# Patient Record
Sex: FEMALE | Race: WHITE | NOT HISPANIC OR LATINO | Employment: OTHER | URBAN - METROPOLITAN AREA
[De-identification: names, ages, dates, MRNs, and addresses within clinical notes are randomized per-mention and may not be internally consistent; named-entity substitution may affect disease eponyms.]

---

## 2019-08-06 ENCOUNTER — OFFICE VISIT (OUTPATIENT)
Dept: INTERNAL MEDICINE | Facility: CLINIC | Age: 84
End: 2019-08-06

## 2019-08-06 VITALS
WEIGHT: 163.8 LBS | SYSTOLIC BLOOD PRESSURE: 114 MMHG | DIASTOLIC BLOOD PRESSURE: 76 MMHG | HEIGHT: 64 IN | BODY MASS INDEX: 27.96 KG/M2

## 2019-08-06 DIAGNOSIS — I35.1 NONRHEUMATIC AORTIC VALVE INSUFFICIENCY: ICD-10-CM

## 2019-08-06 DIAGNOSIS — E78.5 HYPERLIPIDEMIA, UNSPECIFIED HYPERLIPIDEMIA TYPE: ICD-10-CM

## 2019-08-06 DIAGNOSIS — K21.9 GASTROESOPHAGEAL REFLUX DISEASE, ESOPHAGITIS PRESENCE NOT SPECIFIED: ICD-10-CM

## 2019-08-06 DIAGNOSIS — F32.A DEPRESSION, UNSPECIFIED DEPRESSION TYPE: ICD-10-CM

## 2019-08-06 DIAGNOSIS — I10 ESSENTIAL HYPERTENSION: Primary | ICD-10-CM

## 2019-08-06 PROCEDURE — 99214 OFFICE O/P EST MOD 30 MIN: CPT | Performed by: INTERNAL MEDICINE

## 2019-08-06 RX ORDER — CITALOPRAM 20 MG/1
20 TABLET ORAL DAILY
Qty: 90 TABLET | Refills: 1 | Status: SHIPPED | OUTPATIENT
Start: 2019-08-06 | End: 2020-02-07 | Stop reason: SDUPTHER

## 2019-08-06 RX ORDER — SOLIFENACIN SUCCINATE 5 MG/1
5 TABLET, FILM COATED ORAL DAILY
COMMUNITY
Start: 2016-05-26 | End: 2019-08-06

## 2019-08-06 RX ORDER — SIMVASTATIN 20 MG
20 TABLET ORAL NIGHTLY
Qty: 90 TABLET | Refills: 1 | Status: SHIPPED | OUTPATIENT
Start: 2019-08-06 | End: 2020-02-07 | Stop reason: SDUPTHER

## 2019-08-06 RX ORDER — AMLODIPINE BESYLATE 10 MG/1
TABLET ORAL
COMMUNITY
Start: 2019-07-05 | End: 2019-08-06 | Stop reason: SDUPTHER

## 2019-08-06 RX ORDER — OMEPRAZOLE 40 MG/1
40 CAPSULE, DELAYED RELEASE ORAL DAILY
Qty: 90 CAPSULE | Refills: 1 | Status: SHIPPED | OUTPATIENT
Start: 2019-08-06 | End: 2020-02-07 | Stop reason: SDUPTHER

## 2019-08-06 RX ORDER — LOSARTAN POTASSIUM 100 MG/1
100 TABLET ORAL DAILY
COMMUNITY
End: 2019-08-06 | Stop reason: SDUPTHER

## 2019-08-06 RX ORDER — AMLODIPINE BESYLATE 10 MG/1
10 TABLET ORAL DAILY
Qty: 90 TABLET | Refills: 1 | Status: SHIPPED | OUTPATIENT
Start: 2019-08-06 | End: 2020-02-07 | Stop reason: SDUPTHER

## 2019-08-06 RX ORDER — OMEPRAZOLE 40 MG/1
CAPSULE, DELAYED RELEASE ORAL
COMMUNITY
Start: 2019-07-26 | End: 2019-08-06 | Stop reason: SDUPTHER

## 2019-08-06 RX ORDER — CHOLECALCIFEROL (VITAMIN D3) 125 MCG
CAPSULE ORAL
COMMUNITY
End: 2019-08-06

## 2019-08-06 RX ORDER — SIMVASTATIN 20 MG
20 TABLET ORAL DAILY
COMMUNITY
End: 2019-08-06

## 2019-08-06 RX ORDER — ACETAMINOPHEN,DIPHENHYDRAMINE HCL 500; 25 MG/1; MG/1
1 TABLET, FILM COATED ORAL
COMMUNITY
End: 2020-02-07

## 2019-08-06 RX ORDER — LOSARTAN POTASSIUM 100 MG/1
100 TABLET ORAL DAILY
Qty: 90 TABLET | Refills: 1 | Status: SHIPPED | OUTPATIENT
Start: 2019-08-06 | End: 2020-02-07 | Stop reason: SDUPTHER

## 2019-08-06 RX ORDER — CITALOPRAM 20 MG/1
20 TABLET ORAL DAILY
COMMUNITY
End: 2019-08-06

## 2019-08-06 RX ORDER — OMEPRAZOLE 10 MG/1
CAPSULE, DELAYED RELEASE ORAL DAILY
COMMUNITY
End: 2019-08-06

## 2019-08-06 NOTE — PROGRESS NOTES
Subjective        Chief Complaint   Patient presents with   • Follow-up     6 month fup    • Med Refill   • Hyperlipidemia   • Hypertension     The patient does not have a history of falls. I did complete a risk assessment for falls. A plan of care for falls was not documented.;kendall Chaudhary is a 85 y.o. female who presents for    Patient Active Problem List   Diagnosis   • Hypertension   • Hyperlipidemia   • Depression   • Aortic insufficiency       History of Present Illness     She has dyspnea if it is really hot and that is not new. She denies cough, wheeze, or falls. She has not been checking her BP. She saw Dr. Montgomery last week and her BP was 110/80. He is going to repeat an echo this Friday. She has not had reflux. She is doing well emotionally. She walks 30 minutes daily.  Allergies   Allergen Reactions   • Lisinopril Cough   • Nsaids Nausea And Vomiting   • Fosamax [Alendronate] Other (See Comments)     Stomach ache       Current Outpatient Medications on File Prior to Visit   Medication Sig Dispense Refill   • calcium citrate-vitamin D (CITRACAL+D) 315-200 MG-UNIT per tablet Take  by mouth.     • diphenhydrAMINE-acetaminophen (TYLENOL PM)  MG tablet per tablet Take 1 tablet by mouth.     • melatonin 5 MG tablet tablet Take  by mouth.     • Multiple Vitamins-Minerals (CENTRUM SILVER PO) Take 1 tablet by mouth Daily.     • psyllium (METAMUCIL) 58.6 % packet Take 1 packet by mouth.     • solifenacin (VESICARE) 5 MG tablet Take 5 mg by mouth.     • [DISCONTINUED] amLODIPine (NORVASC) 10 MG tablet      • [DISCONTINUED] aspirin 81 MG tablet Take 81 mg by mouth.     • [DISCONTINUED] citalopram (CeleXA) 20 MG tablet Take 20 mg by mouth.     • [DISCONTINUED] losartan (COZAAR) 100 MG tablet Take 100 mg by mouth Daily.     • [DISCONTINUED] omeprazole (prilOSEC) 10 MG capsule Take  by mouth.     • [DISCONTINUED] omeprazole (priLOSEC) 40 MG capsule      • [DISCONTINUED] simvastatin (ZOCOR) 20 MG tablet  Take 20 mg by mouth.     • [DISCONTINUED] amLODIPine (NORVASC) 2.5 MG tablet Take 2.5 mg by mouth.     • [DISCONTINUED] citalopram (CeleXA) 20 MG tablet Take 20 mg by mouth Daily.     • [DISCONTINUED] losartan (COZAAR) 100 MG tablet Take 100 mg by mouth.     • [DISCONTINUED] melatonin 5 MG tablet tablet Take  by mouth.     • [DISCONTINUED] omeprazole (prilOSEC) 10 MG capsule Take  by mouth Daily.     • [DISCONTINUED] psyllium (METAMUCIL) 58.6 % packet Take 1 packet by mouth Daily.     • [DISCONTINUED] simvastatin (ZOCOR) 20 MG tablet Take 20 mg by mouth Daily.     • [DISCONTINUED] solifenacin (VESICARE) 5 MG tablet Take 5 mg by mouth Daily.       No current facility-administered medications on file prior to visit.        Past Medical History:   Diagnosis Date   • Aortic insufficiency 2015    moderate   • BCC (basal cell carcinoma of skin)    • Depression    • Diverticulosis 2012   • History of positive PPD, untreated    • Hyperactivity of bladder    • Hyperlipidemia    • Hypertension    • LVH (left ventricular hypertrophy)    • Osteopenia    • Pulmonary HTN (CMS/HCC)    • Right hip pain    • Scoliosis    • SOB (shortness of breath)    • Tricuspid regurgitation        Past Surgical History:   Procedure Laterality Date   • BREAST SURGERY     • CATARACT EXTRACTION     • COLONOSCOPY  2013   • DILATATION AND CURETTAGE         Family History   Problem Relation Age of Onset   • Cancer Mother         vaginal   • Stroke Father    • Cancer Sister         liver   • Diabetes Brother    • Stroke Brother    • Hypertension Brother        Social History     Socioeconomic History   • Marital status:      Spouse name: Not on file   • Number of children: Not on file   • Years of education: Not on file   • Highest education level: Not on file   Tobacco Use   • Smoking status: Never Smoker   • Smokeless tobacco: Never Used   Substance and Sexual Activity   • Alcohol use: Yes     Frequency: Monthly or less     Comment: rare   •  "Drug use: No   • Sexual activity: Defer           The following portions of the patient's history were reviewed and updated as appropriate: problem list, allergies, current medications, past medical history, past family history, past social history and past surgical history.    Review of Systems   Respiratory: Positive for shortness of breath.    Gastrointestinal: Negative for GERD.   Psychiatric/Behavioral: The patient is not nervous/anxious.        Immunization History   Administered Date(s) Administered   • Flu Vaccine High Dose PF 65YR+ 09/29/2017, 09/30/2018   • Hepatitis A 05/01/2018, 01/06/2019   • Pneumococcal Conjugate 13-Valent (PCV13) 03/31/2015   • Pneumococcal Polysaccharide (PPSV23) 01/01/2001   • Tdap 04/01/2016   • Zostavax 01/01/2008       Objective   Vitals:    08/06/19 1022   BP: 114/76   Weight: 74.3 kg (163 lb 12.8 oz)   Height: 162.6 cm (64\")     Physical Exam   Constitutional: She appears well-developed and well-nourished.   HENT:   Head: Normocephalic and atraumatic.   Neck: Carotid bruit is not present.   Cardiovascular: Normal rate, regular rhythm, S1 normal and S2 normal.   Murmur heard.   Systolic murmur is present with a grade of 1/6.  Pulmonary/Chest: Effort normal and breath sounds normal.   Neurological: She is alert.   Skin: Skin is warm.   Psychiatric: She has a normal mood and affect.   Vitals reviewed.      Procedures    Assessment/Plan   Shiela was seen today for follow-up, med refill, hyperlipidemia and hypertension.    Diagnoses and all orders for this visit:    Essential hypertension  -     Comprehensive Metabolic Panel; Future  -     amLODIPine (NORVASC) 10 MG tablet; Take 1 tablet by mouth Daily.  -     losartan (COZAAR) 100 MG tablet; Take 1 tablet by mouth Daily.    Hyperlipidemia, unspecified hyperlipidemia type  -     Lipid Panel With / Chol / HDL Ratio; Future  -     simvastatin (ZOCOR) 20 MG tablet; Take 1 tablet by mouth Every Night.    Nonrheumatic aortic valve " insufficiency    Depression, unspecified depression type  -     citalopram (CeleXA) 20 MG tablet; Take 1 tablet by mouth Daily.    Gastroesophageal reflux disease, esophagitis presence not specified  -     omeprazole (priLOSEC) 40 MG capsule; Take 1 capsule by mouth Daily.             BP is great. Doing well emotionally. To get an echo this Friday. Reflux is stable. She is walking regularly. Fall risk is low so does not need further assessment. She sees her gyn yearly.   Okay to stop aspirin. Discussed shingrix. Sign for old records.  Return in about 6 months (around 2/6/2020).

## 2019-11-26 ENCOUNTER — OFFICE VISIT (OUTPATIENT)
Dept: INTERNAL MEDICINE | Facility: CLINIC | Age: 84
End: 2019-11-26

## 2019-11-26 ENCOUNTER — TELEPHONE (OUTPATIENT)
Dept: INTERNAL MEDICINE | Facility: CLINIC | Age: 84
End: 2019-11-26

## 2019-11-26 VITALS
HEIGHT: 65 IN | SYSTOLIC BLOOD PRESSURE: 132 MMHG | DIASTOLIC BLOOD PRESSURE: 80 MMHG | BODY MASS INDEX: 27.92 KG/M2 | WEIGHT: 167.6 LBS

## 2019-11-26 DIAGNOSIS — R07.81 RIB PAIN ON LEFT SIDE: Primary | ICD-10-CM

## 2019-11-26 PROCEDURE — 99213 OFFICE O/P EST LOW 20 MIN: CPT | Performed by: INTERNAL MEDICINE

## 2019-11-26 NOTE — PROGRESS NOTES
Subjective        Chief Complaint   Patient presents with   • Pain     left rib           Shiela Chaudhary is a 85 y.o. female who presents for    Patient Active Problem List   Diagnosis   • Hypertension   • Hyperlipidemia   • Depression   • Aortic insufficiency   • Rib pain on left side       History of Present Illness     She has hurt on her left ribs for the last 10 days. She has not taken anything for it. She is not short of breath. She is not coughing.  Allergies   Allergen Reactions   • Lisinopril Cough   • Nsaids Nausea And Vomiting   • Fosamax [Alendronate] Other (See Comments)     Stomach ache       Current Outpatient Medications on File Prior to Visit   Medication Sig Dispense Refill   • amLODIPine (NORVASC) 10 MG tablet Take 1 tablet by mouth Daily. 90 tablet 1   • calcium citrate-vitamin D (CITRACAL+D) 315-200 MG-UNIT per tablet Take  by mouth.     • citalopram (CeleXA) 20 MG tablet Take 1 tablet by mouth Daily. 90 tablet 1   • diphenhydrAMINE-acetaminophen (TYLENOL PM)  MG tablet per tablet Take 1 tablet by mouth.     • losartan (COZAAR) 100 MG tablet Take 1 tablet by mouth Daily. 90 tablet 1   • melatonin 5 MG tablet tablet Take  by mouth.     • Multiple Vitamins-Minerals (CENTRUM SILVER PO) Take 1 tablet by mouth Daily.     • omeprazole (priLOSEC) 40 MG capsule Take 1 capsule by mouth Daily. 90 capsule 1   • psyllium (METAMUCIL) 58.6 % packet Take 1 packet by mouth.     • simvastatin (ZOCOR) 20 MG tablet Take 1 tablet by mouth Every Night. 90 tablet 1   • solifenacin (VESICARE) 5 MG tablet Take 5 mg by mouth.       No current facility-administered medications on file prior to visit.        Past Medical History:   Diagnosis Date   • Aortic insufficiency 2015    moderate   • BCC (basal cell carcinoma of skin)    • Depression    • Diverticulosis 2012   • History of positive PPD, untreated    • Hyperactivity of bladder    • Hyperlipidemia    • Hypertension    • LVH (left ventricular hypertrophy)    •  "Osteopenia    • Pulmonary HTN (CMS/HCC)    • Right hip pain    • Scoliosis    • SOB (shortness of breath)    • Tricuspid regurgitation        Past Surgical History:   Procedure Laterality Date   • BREAST SURGERY     • CATARACT EXTRACTION     • COLONOSCOPY  2013   • DILATATION AND CURETTAGE         Family History   Problem Relation Age of Onset   • Cancer Mother         vaginal   • Stroke Father    • Cancer Sister         liver   • Diabetes Brother    • Stroke Brother    • Hypertension Brother        Social History     Socioeconomic History   • Marital status:      Spouse name: Not on file   • Number of children: Not on file   • Years of education: Not on file   • Highest education level: Not on file   Tobacco Use   • Smoking status: Never Smoker   • Smokeless tobacco: Never Used   Substance and Sexual Activity   • Alcohol use: Yes     Frequency: Monthly or less     Comment: rare   • Drug use: No   • Sexual activity: Defer           The following portions of the patient's history were reviewed and updated as appropriate: problem list, allergies, current medications, past medical history, past family history, past social history and past surgical history.    Review of Systems   Respiratory: Negative for cough and shortness of breath.        Immunization History   Administered Date(s) Administered   • FLUAD TRI 65YR+ 09/24/2019   • Fluzone High Dose =>65 Years ([employer] ONLY) 09/29/2017, 09/30/2018   • Hepatitis A 05/01/2018, 01/06/2019   • Pneumococcal Conjugate 13-Valent (PCV13) 03/31/2015   • Pneumococcal Polysaccharide (PPSV23) 01/01/2001   • Tdap 04/01/2016   • Zostavax 01/01/2008       Objective   Vitals:    11/26/19 1611   BP: 132/80   Weight: 76 kg (167 lb 9.6 oz)   Height: 165.1 cm (65\")     Body mass index is 27.89 kg/m².  Physical Exam   Constitutional: She appears well-developed and well-nourished.   HENT:   Head: Normocephalic and atraumatic.   Cardiovascular: Normal rate, regular rhythm, S1 normal, " S2 normal and normal heart sounds.   Pulmonary/Chest: Effort normal and breath sounds normal.   Neurological: She is alert.   Skin: Skin is warm.   No rash on her left thorax    Left ribs are non tender   Psychiatric: She has a normal mood and affect.   Vitals reviewed.      Procedures    Assessment/Plan   Shiela was seen today for pain.    Diagnoses and all orders for this visit:    Rib pain on left side             Use tylenol BID since she has not tried anything. Also use heating pad. If not better by Monday, she will call and we can order xray left ribs. 13 minutes spent with over 50 percent on counseling and coordinating care.    No Follow-up on file.

## 2020-01-29 DIAGNOSIS — I10 ESSENTIAL HYPERTENSION: ICD-10-CM

## 2020-01-29 DIAGNOSIS — E78.5 HYPERLIPIDEMIA, UNSPECIFIED HYPERLIPIDEMIA TYPE: ICD-10-CM

## 2020-02-06 LAB
ALBUMIN SERPL-MCNC: 4.4 G/DL (ref 3.5–5.2)
ALBUMIN/GLOB SERPL: 1.7 G/DL
ALP SERPL-CCNC: 82 U/L (ref 39–117)
ALT SERPL-CCNC: 14 U/L (ref 1–33)
AST SERPL-CCNC: 19 U/L (ref 1–32)
BILIRUB SERPL-MCNC: 0.4 MG/DL (ref 0.2–1.2)
BUN SERPL-MCNC: 13 MG/DL (ref 8–23)
BUN/CREAT SERPL: 17.8 (ref 7–25)
CALCIUM SERPL-MCNC: 9.3 MG/DL (ref 8.6–10.5)
CHLORIDE SERPL-SCNC: 98 MMOL/L (ref 98–107)
CHOLEST SERPL-MCNC: 163 MG/DL (ref 0–200)
CHOLEST/HDLC SERPL: 3.26 {RATIO}
CO2 SERPL-SCNC: 27.9 MMOL/L (ref 22–29)
CREAT SERPL-MCNC: 0.73 MG/DL (ref 0.57–1)
GLOBULIN SER CALC-MCNC: 2.6 GM/DL
GLUCOSE SERPL-MCNC: 89 MG/DL (ref 65–99)
HDLC SERPL-MCNC: 50 MG/DL (ref 40–60)
LDLC SERPL CALC-MCNC: 90 MG/DL (ref 0–100)
POTASSIUM SERPL-SCNC: 4.2 MMOL/L (ref 3.5–5.2)
PROT SERPL-MCNC: 7 G/DL (ref 6–8.5)
SODIUM SERPL-SCNC: 137 MMOL/L (ref 136–145)
TRIGL SERPL-MCNC: 116 MG/DL (ref 0–150)
VLDLC SERPL CALC-MCNC: 23.2 MG/DL

## 2020-02-07 ENCOUNTER — OFFICE VISIT (OUTPATIENT)
Dept: INTERNAL MEDICINE | Facility: CLINIC | Age: 85
End: 2020-02-07

## 2020-02-07 VITALS
WEIGHT: 171.4 LBS | SYSTOLIC BLOOD PRESSURE: 122 MMHG | BODY MASS INDEX: 29.26 KG/M2 | HEIGHT: 64 IN | DIASTOLIC BLOOD PRESSURE: 78 MMHG

## 2020-02-07 DIAGNOSIS — F32.5 MAJOR DEPRESSIVE DISORDER IN REMISSION, UNSPECIFIED WHETHER RECURRENT (HCC): ICD-10-CM

## 2020-02-07 DIAGNOSIS — M85.80 OSTEOPENIA, UNSPECIFIED LOCATION: ICD-10-CM

## 2020-02-07 DIAGNOSIS — E78.5 HYPERLIPIDEMIA, UNSPECIFIED HYPERLIPIDEMIA TYPE: ICD-10-CM

## 2020-02-07 DIAGNOSIS — E78.49 OTHER HYPERLIPIDEMIA: ICD-10-CM

## 2020-02-07 DIAGNOSIS — I35.1 NONRHEUMATIC AORTIC VALVE INSUFFICIENCY: ICD-10-CM

## 2020-02-07 DIAGNOSIS — F32.A DEPRESSION, UNSPECIFIED DEPRESSION TYPE: ICD-10-CM

## 2020-02-07 DIAGNOSIS — Z78.0 POST-MENOPAUSAL: ICD-10-CM

## 2020-02-07 DIAGNOSIS — K21.9 GASTROESOPHAGEAL REFLUX DISEASE, ESOPHAGITIS PRESENCE NOT SPECIFIED: ICD-10-CM

## 2020-02-07 DIAGNOSIS — I10 ESSENTIAL HYPERTENSION: Primary | ICD-10-CM

## 2020-02-07 PROCEDURE — 99214 OFFICE O/P EST MOD 30 MIN: CPT | Performed by: INTERNAL MEDICINE

## 2020-02-07 RX ORDER — OMEPRAZOLE 40 MG/1
40 CAPSULE, DELAYED RELEASE ORAL DAILY
Qty: 90 CAPSULE | Refills: 3 | Status: SHIPPED | OUTPATIENT
Start: 2020-02-07 | End: 2021-04-29

## 2020-02-07 RX ORDER — LOSARTAN POTASSIUM 100 MG/1
100 TABLET ORAL DAILY
Qty: 90 TABLET | Refills: 3 | Status: SHIPPED | OUTPATIENT
Start: 2020-02-07 | End: 2021-02-26

## 2020-02-07 RX ORDER — CITALOPRAM 20 MG/1
20 TABLET ORAL DAILY
Qty: 90 TABLET | Refills: 3 | Status: SHIPPED | OUTPATIENT
Start: 2020-02-07 | End: 2020-04-09

## 2020-02-07 RX ORDER — ACETAMINOPHEN 500 MG
500 TABLET ORAL EVERY 6 HOURS PRN
COMMUNITY

## 2020-02-07 RX ORDER — AMLODIPINE BESYLATE 10 MG/1
10 TABLET ORAL DAILY
Qty: 90 TABLET | Refills: 3 | Status: SHIPPED | OUTPATIENT
Start: 2020-02-07 | End: 2021-04-06

## 2020-02-07 RX ORDER — SIMVASTATIN 20 MG
20 TABLET ORAL NIGHTLY
Qty: 90 TABLET | Refills: 3 | Status: SHIPPED | OUTPATIENT
Start: 2020-02-07 | End: 2021-04-13

## 2020-02-07 RX ORDER — METRONIDAZOLE 7.5 MG/G
LOTION TOPICAL
COMMUNITY
Start: 2020-01-19 | End: 2020-06-26

## 2020-02-07 NOTE — PROGRESS NOTES
Subjective        Chief Complaint   Patient presents with   • Hypertension     6 month fup htn med eval lab review            Shiela Chaudhary is a 85 y.o. female who presents for    Patient Active Problem List   Diagnosis   • Hypertension   • Hyperlipidemia   • Depression   • Aortic insufficiency   • Osteopenia       History of Present Illness     Her only dyspnea is with bending over. She sleeps on one pillow. She denies chest pain. She walks her dog for 15 minutes. She is doing well emotionally; she is not depressed. She has not checked her BP.   Allergies   Allergen Reactions   • Lisinopril Cough   • Nsaids Nausea And Vomiting   • Fosamax [Alendronate] Other (See Comments)     Stomach ache       Current Outpatient Medications on File Prior to Visit   Medication Sig Dispense Refill   • acetaminophen (TYLENOL) 500 MG tablet Take 500 mg by mouth Every 6 (Six) Hours As Needed for Mild Pain .     • calcium citrate-vitamin D (CITRACAL+D) 315-200 MG-UNIT per tablet Take  by mouth.     • melatonin 5 MG tablet tablet Take  by mouth.     • metroNIDAZOLE (FLAGYL) 0.75 % lotion lotion      • Multiple Vitamins-Minerals (CENTRUM SILVER PO) Take 1 tablet by mouth Daily.     • psyllium (METAMUCIL) 58.6 % packet Take 1 packet by mouth.     • solifenacin (VESICARE) 5 MG tablet Take 5 mg by mouth.     • [DISCONTINUED] amLODIPine (NORVASC) 10 MG tablet Take 1 tablet by mouth Daily. 90 tablet 1   • [DISCONTINUED] citalopram (CeleXA) 20 MG tablet Take 1 tablet by mouth Daily. 90 tablet 1   • [DISCONTINUED] losartan (COZAAR) 100 MG tablet Take 1 tablet by mouth Daily. 90 tablet 1   • [DISCONTINUED] omeprazole (priLOSEC) 40 MG capsule Take 1 capsule by mouth Daily. 90 capsule 1   • [DISCONTINUED] simvastatin (ZOCOR) 20 MG tablet Take 1 tablet by mouth Every Night. 90 tablet 1   • [DISCONTINUED] diphenhydrAMINE-acetaminophen (TYLENOL PM)  MG tablet per tablet Take 1 tablet by mouth.       No current facility-administered medications  on file prior to visit.        Past Medical History:   Diagnosis Date   • Aortic insufficiency 2015    moderate   • BCC (basal cell carcinoma of skin)    • Depression    • Diverticulosis 2012   • History of positive PPD, untreated    • Hyperactivity of bladder    • Hyperlipidemia    • Hypertension    • LVH (left ventricular hypertrophy)    • Osteopenia    • Pulmonary HTN (CMS/HCC)    • Right hip pain    • Scoliosis    • SOB (shortness of breath)    • Tricuspid regurgitation        Past Surgical History:   Procedure Laterality Date   • BREAST SURGERY     • CATARACT EXTRACTION     • COLONOSCOPY  2013   • DILATATION AND CURETTAGE         Family History   Problem Relation Age of Onset   • Cancer Mother         vaginal   • Stroke Father    • Cancer Sister         liver   • Diabetes Brother    • Stroke Brother    • Hypertension Brother        Social History     Socioeconomic History   • Marital status:      Spouse name: Not on file   • Number of children: Not on file   • Years of education: Not on file   • Highest education level: Not on file   Tobacco Use   • Smoking status: Never Smoker   • Smokeless tobacco: Never Used   Substance and Sexual Activity   • Alcohol use: Yes     Frequency: Monthly or less     Comment: rare   • Drug use: No   • Sexual activity: Defer           The following portions of the patient's history were reviewed and updated as appropriate: problem list, allergies, current medications, past medical history, past family history, past social history and past surgical history.    Review of Systems   Cardiovascular: Negative for chest pain.   Psychiatric/Behavioral: Negative for depressed mood.       Immunization History   Administered Date(s) Administered   • FLUAD TRI 65YR+ 09/24/2019   • Fluzone High Dose =>65 Years ([employer] ONLY) 09/29/2017, 09/30/2018   • Hepatitis A 05/01/2018, 01/06/2019   • Pneumococcal Conjugate 13-Valent (PCV13) 03/31/2015   • Pneumococcal Polysaccharide (PPSV23)  "01/01/2001   • Tdap 04/01/2016   • Zostavax 01/01/2008       Objective   Vitals:    02/07/20 1021   BP: 122/78   Weight: 77.7 kg (171 lb 6.4 oz)   Height: 162.6 cm (64\")     Body mass index is 29.42 kg/m².  Physical Exam   Constitutional: She appears well-developed and well-nourished.   HENT:   Head: Normocephalic and atraumatic.   Cardiovascular: Normal rate, regular rhythm, S1 normal and S2 normal.   Murmur heard.   Systolic murmur is present with a grade of 1/6.  Pulmonary/Chest: Effort normal and breath sounds normal.   Neurological: She is alert.   Skin: Skin is warm.   Psychiatric: She has a normal mood and affect.   Vitals reviewed.      Procedures    Assessment/Plan   Shiela was seen today for hypertension.    Diagnoses and all orders for this visit:    Essential hypertension  -     losartan (COZAAR) 100 MG tablet; Take 1 tablet by mouth Daily.  -     amLODIPine (NORVASC) 10 MG tablet; Take 1 tablet by mouth Daily.    Other hyperlipidemia  Comments:  LDL is excellent    Major depressive disorder in remission, unspecified whether recurrent (CMS/[facility])  Comments:  She is stable emotionally    Nonrheumatic aortic valve insufficiency  Comments:  She sees Dr. Montgomery once per year    Osteopenia, unspecified location    Hyperlipidemia, unspecified hyperlipidemia type  -     simvastatin (ZOCOR) 20 MG tablet; Take 1 tablet by mouth Every Night.    Depression, unspecified depression type  -     citalopram (CeleXA) 20 MG tablet; Take 1 tablet by mouth Daily.    Gastroesophageal reflux disease, esophagitis presence not specified  -     omeprazole (priLOSEC) 40 MG capsule; Take 1 capsule by mouth Daily.    Post-menopausal  -     DEXA Bone Density Axial; Future               Labs reviewed. BP is good. She is going to get her MMG at [facility]. Asked her to drop off advanced directive. DEXA next time.  Return in about 6 months (around 8/7/2020).  "

## 2020-04-08 DIAGNOSIS — F32.A DEPRESSION, UNSPECIFIED DEPRESSION TYPE: ICD-10-CM

## 2020-04-09 RX ORDER — CITALOPRAM 20 MG/1
TABLET ORAL
Qty: 90 TABLET | Refills: 0 | Status: SHIPPED | OUTPATIENT
Start: 2020-04-09 | End: 2021-10-04

## 2020-06-26 ENCOUNTER — OFFICE VISIT (OUTPATIENT)
Dept: INTERNAL MEDICINE | Facility: CLINIC | Age: 85
End: 2020-06-26

## 2020-06-26 VITALS
TEMPERATURE: 97.6 F | BODY MASS INDEX: 29.09 KG/M2 | WEIGHT: 181 LBS | SYSTOLIC BLOOD PRESSURE: 134 MMHG | DIASTOLIC BLOOD PRESSURE: 78 MMHG | HEIGHT: 66 IN

## 2020-06-26 DIAGNOSIS — I10 ESSENTIAL HYPERTENSION: ICD-10-CM

## 2020-06-26 DIAGNOSIS — M54.6 THORACIC SPINE PAIN: Primary | ICD-10-CM

## 2020-06-26 PROCEDURE — 99213 OFFICE O/P EST LOW 20 MIN: CPT | Performed by: INTERNAL MEDICINE

## 2020-06-26 NOTE — PROGRESS NOTES
Subjective        Chief Complaint   Patient presents with   • Back Pain     x1 week           Shiela Chaudhary is a 86 y.o. female who presents for    Patient Active Problem List   Diagnosis   • Hypertension   • Hyperlipidemia   • Depression   • Aortic insufficiency   • Osteopenia   • Thoracic spine pain       History of Present Illness     She aches across the top of her shoulders for one week. She was lifting up a full sprinkling can and then she noticed pain afterwards. She used a heating pad and it helps. She has no arm weakness. She has good ROM of her shoulders. The pain does not keep her up at night.  Allergies   Allergen Reactions   • Lisinopril Cough   • Nsaids Nausea And Vomiting   • Fosamax [Alendronate] Other (See Comments)     Stomach ache       Current Outpatient Medications on File Prior to Visit   Medication Sig Dispense Refill   • acetaminophen (TYLENOL) 500 MG tablet Take 500 mg by mouth Every 6 (Six) Hours As Needed for Mild Pain .     • amLODIPine (NORVASC) 10 MG tablet Take 1 tablet by mouth Daily. 90 tablet 3   • citalopram (CeleXA) 20 MG tablet TAKE ONE TABLET BY MOUTH DAILY 90 tablet 0   • losartan (COZAAR) 100 MG tablet Take 1 tablet by mouth Daily. 90 tablet 3   • melatonin 5 MG tablet tablet Take  by mouth.     • Multiple Vitamins-Minerals (CENTRUM SILVER PO) Take 1 tablet by mouth Daily.     • omeprazole (priLOSEC) 40 MG capsule Take 1 capsule by mouth Daily. 90 capsule 3   • psyllium (METAMUCIL) 58.6 % packet Take 1 packet by mouth.     • simvastatin (ZOCOR) 20 MG tablet Take 1 tablet by mouth Every Night. 90 tablet 3   • solifenacin (VESICARE) 5 MG tablet Take 5 mg by mouth.     • [DISCONTINUED] calcium citrate-vitamin D (CITRACAL+D) 315-200 MG-UNIT per tablet Take  by mouth.     • [DISCONTINUED] metroNIDAZOLE (FLAGYL) 0.75 % lotion lotion        No current facility-administered medications on file prior to visit.        Past Medical History:   Diagnosis Date   • Aortic insufficiency 2015     moderate   • BCC (basal cell carcinoma of skin)    • Depression    • Diverticulosis 2012   • History of positive PPD, untreated    • Hyperactivity of bladder    • Hyperlipidemia    • Hypertension    • LVH (left ventricular hypertrophy)    • Osteopenia    • Pulmonary HTN (CMS/HCC)    • Right hip pain    • Scoliosis    • SOB (shortness of breath)    • Tricuspid regurgitation        Past Surgical History:   Procedure Laterality Date   • BREAST SURGERY     • CATARACT EXTRACTION     • COLONOSCOPY  2013   • DILATATION AND CURETTAGE         Family History   Problem Relation Age of Onset   • Cancer Mother         vaginal   • Stroke Father    • Cancer Sister         liver   • Diabetes Brother    • Stroke Brother    • Hypertension Brother        Social History     Socioeconomic History   • Marital status:      Spouse name: Not on file   • Number of children: Not on file   • Years of education: Not on file   • Highest education level: Not on file   Tobacco Use   • Smoking status: Never Smoker   • Smokeless tobacco: Never Used   Substance and Sexual Activity   • Alcohol use: Yes     Frequency: Monthly or less     Comment: rare   • Drug use: No   • Sexual activity: Defer           The following portions of the patient's history were reviewed and updated as appropriate: problem list, allergies, current medications, past medical history, past family history, past social history and past surgical history.    Review of Systems   Cardiovascular: Negative for chest pain.   Musculoskeletal: Positive for back pain.       Immunization History   Administered Date(s) Administered   • FLUAD TRI 65YR+ 09/24/2019   • Fluzone High Dose =>65 Years ([employer] ONLY) 09/29/2017, 09/30/2018   • Hepatitis A 05/01/2018, 01/06/2019   • Pneumococcal Conjugate 13-Valent (PCV13) 03/31/2015   • Pneumococcal Polysaccharide (PPSV23) 01/01/2001   • Tdap 04/01/2016   • Zostavax 01/01/2008       Objective   Vitals:    06/26/20 1656   BP: 134/78   Temp:  "97.6 °F (36.4 °C)   Weight: 82.1 kg (181 lb)   Height: 166.4 cm (65.5\")     Body mass index is 29.66 kg/m².  Physical Exam   Constitutional: She appears well-developed and well-nourished.   HENT:   Head: Normocephalic and atraumatic.   Cardiovascular: Normal rate, regular rhythm, S1 normal, S2 normal and normal heart sounds.   Pulmonary/Chest: Effort normal and breath sounds normal.   Musculoskeletal:   Back non tender    Shoulders FROM   Neurological: She is alert.   Skin: Skin is warm.   Psychiatric: She has a normal mood and affect.   Vitals reviewed.      Procedures    Assessment/Plan   Shiela was seen today for back pain.    Diagnoses and all orders for this visit:    Thoracic spine pain    Essential hypertension               I think she pulled something. Recc continue heat and APAP. BP is okay today.  No follow-ups on file.  "

## 2020-06-30 ENCOUNTER — HOSPITAL ENCOUNTER (OUTPATIENT)
Dept: BONE DENSITY | Facility: HOSPITAL | Age: 85
Discharge: HOME | End: 2020-06-30
Admitting: INTERNAL MEDICINE

## 2020-06-30 DIAGNOSIS — Z78.0 POST-MENOPAUSAL: ICD-10-CM

## 2020-06-30 PROCEDURE — 77080 DXA BONE DENSITY AXIAL: CPT

## 2020-07-16 ENCOUNTER — TELEPHONE (OUTPATIENT)
Dept: URGENT CARE | Facility: CLINIC | Age: 85
End: 2020-07-16

## 2020-07-16 NOTE — TELEPHONE ENCOUNTER
PATIENT CALLED BACK.  INFORMED PATIENT OF PROVIDER'S, ADDRESSA JULI, X-RAY FINDINGS.  ADVISED TO  SLING AND TO FOLLOW UP WITH ORTHO.

## 2020-08-07 ENCOUNTER — OFFICE VISIT (OUTPATIENT)
Dept: INTERNAL MEDICINE | Facility: CLINIC | Age: 85
End: 2020-08-07

## 2020-08-07 VITALS
TEMPERATURE: 98.1 F | HEIGHT: 62 IN | DIASTOLIC BLOOD PRESSURE: 82 MMHG | SYSTOLIC BLOOD PRESSURE: 120 MMHG | WEIGHT: 166.6 LBS | BODY MASS INDEX: 30.66 KG/M2

## 2020-08-07 DIAGNOSIS — F32.89 OTHER DEPRESSION: ICD-10-CM

## 2020-08-07 DIAGNOSIS — M85.80 OSTEOPENIA, UNSPECIFIED LOCATION: ICD-10-CM

## 2020-08-07 DIAGNOSIS — I10 ESSENTIAL HYPERTENSION: Primary | ICD-10-CM

## 2020-08-07 DIAGNOSIS — E78.49 OTHER HYPERLIPIDEMIA: ICD-10-CM

## 2020-08-07 PROCEDURE — 99213 OFFICE O/P EST LOW 20 MIN: CPT | Performed by: INTERNAL MEDICINE

## 2020-08-07 NOTE — PROGRESS NOTES
Subjective        Chief Complaint   Patient presents with   • Hypertension           Shiela Chaudhary is a 86 y.o. female who presents for    Patient Active Problem List   Diagnosis   • Hypertension   • Hyperlipidemia   • Depression   • Aortic insufficiency   • Osteopenia   • Thoracic spine pain       History of Present Illness     She has not been checking her BP. She is walking some. She denies chest pain or reflux.  Allergies   Allergen Reactions   • Lisinopril Cough   • Nsaids Nausea And Vomiting   • Fosamax [Alendronate] Other (See Comments)     Stomach ache       Current Outpatient Medications on File Prior to Visit   Medication Sig Dispense Refill   • acetaminophen (TYLENOL) 500 MG tablet Take 500 mg by mouth Every 6 (Six) Hours As Needed for Mild Pain .     • amLODIPine (NORVASC) 10 MG tablet Take 1 tablet by mouth Daily. 90 tablet 3   • citalopram (CeleXA) 20 MG tablet TAKE ONE TABLET BY MOUTH DAILY 90 tablet 0   • losartan (COZAAR) 100 MG tablet Take 1 tablet by mouth Daily. 90 tablet 3   • melatonin 5 MG tablet tablet Take  by mouth.     • Multiple Vitamins-Minerals (CENTRUM SILVER PO) Take 1 tablet by mouth Daily.     • omeprazole (priLOSEC) 40 MG capsule Take 1 capsule by mouth Daily. 90 capsule 3   • psyllium (METAMUCIL) 58.6 % packet Take 1 packet by mouth.     • simvastatin (ZOCOR) 20 MG tablet Take 1 tablet by mouth Every Night. 90 tablet 3   • solifenacin (VESICARE) 5 MG tablet Take 5 mg by mouth.       No current facility-administered medications on file prior to visit.        Past Medical History:   Diagnosis Date   • Aortic insufficiency 2015    moderate   • BCC (basal cell carcinoma of skin)    • Depression    • Diverticulosis 2012   • History of positive PPD, untreated    • Hyperactivity of bladder    • Hyperlipidemia    • Hypertension    • LVH (left ventricular hypertrophy)    • Osteopenia    • Pulmonary HTN (CMS/HCC)    • Right hip pain    • Scoliosis    • SOB (shortness of breath)    •  "Tricuspid regurgitation        Past Surgical History:   Procedure Laterality Date   • BREAST SURGERY     • CATARACT EXTRACTION     • COLONOSCOPY  2013   • DILATATION AND CURETTAGE         Family History   Problem Relation Age of Onset   • Cancer Mother         vaginal   • Stroke Father    • Cancer Sister         liver   • Diabetes Brother    • Stroke Brother    • Hypertension Brother        Social History     Socioeconomic History   • Marital status:      Spouse name: Not on file   • Number of children: Not on file   • Years of education: Not on file   • Highest education level: Not on file   Tobacco Use   • Smoking status: Never Smoker   • Smokeless tobacco: Never Used   Substance and Sexual Activity   • Alcohol use: Yes     Frequency: Monthly or less     Comment: rare   • Drug use: No   • Sexual activity: Defer           The following portions of the patient's history were reviewed and updated as appropriate: problem list, allergies, current medications, past medical history, past family history, past social history and past surgical history.    Review of Systems   Respiratory: Negative for shortness of breath.    Cardiovascular: Negative for chest pain.       Immunization History   Administered Date(s) Administered   • FLUAD TRI 65YR+ 09/24/2019   • Fluzone High Dose =>65 Years (Vaxcare ONLY) 09/29/2017, 09/30/2018   • Hepatitis A 05/01/2018, 01/06/2019   • Pneumococcal Conjugate 13-Valent (PCV13) 03/31/2015   • Pneumococcal Polysaccharide (PPSV23) 01/01/2001   • Tdap 04/01/2016   • Zostavax 01/01/2008       Objective   Vitals:    08/07/20 1120   BP: 120/82   Temp: 98.1 °F (36.7 °C)   Weight: 75.6 kg (166 lb 9.6 oz)   Height: 157.5 cm (62\")     Body mass index is 30.47 kg/m².  Physical Exam   Constitutional: She appears well-developed and well-nourished.   HENT:   Head: Normocephalic and atraumatic.   Neck: Carotid bruit is not present.   Cardiovascular: Normal rate, regular rhythm, S1 normal and S2 " normal.   Murmur heard.   Systolic murmur is present with a grade of 1/6.  Pulmonary/Chest: Effort normal and breath sounds normal.   Neurological: She is alert.   Skin: Skin is warm.   Psychiatric: She has a normal mood and affect.   Vitals reviewed.      Procedures    Assessment/Plan   Shiela was seen today for hypertension.    Diagnoses and all orders for this visit:    Essential hypertension    Other depression    Osteopenia, unspecified location    Other hyperlipidemia  -     Comprehensive Metabolic Panel; Future  -     Lipid Panel With / Chol / HDL Ratio; Future               BP is good. Stable emotionally. Reviewed DEXA. Discussed calcium and vit D.  Return in about 6 months (around 2/7/2021) for Medicare Wellness.

## 2020-09-17 ENCOUNTER — TELEPHONE (OUTPATIENT)
Dept: INTERNAL MEDICINE | Facility: CLINIC | Age: 85
End: 2020-09-17

## 2020-09-17 NOTE — TELEPHONE ENCOUNTER
Totally fine to get the new one. It is not a live vaccine. She actually got the live shot in 2008 but it likely has worn off since those only last about 6 years

## 2020-09-17 NOTE — TELEPHONE ENCOUNTER
PATIENT IS CALLING IN SHE STATES SHE WOULD CLARIFICATION ON [facility]ETHER OR NOT SHE SHOULD GET THE SHINGLES SHOT.    SHE STATES IS NOT CERTAIN THAT SHE EVER HAD CHICKEN POX IN THE PAST AND WAS TOLD IF SHE WAS NOT SURE SHE SHOULDN'T GET THE SHOT.    CAN YOU CALLBACK AND VERIFY THIS WITH HER.    CALLBACK NUMBER IS:  [phone]

## 2021-02-26 DIAGNOSIS — I10 ESSENTIAL HYPERTENSION: ICD-10-CM

## 2021-02-26 RX ORDER — LOSARTAN POTASSIUM 100 MG/1
TABLET ORAL
Qty: 90 TABLET | Refills: 2 | Status: SHIPPED | OUTPATIENT
Start: 2021-02-26 | End: 2022-03-07

## 2021-04-05 DIAGNOSIS — I10 ESSENTIAL HYPERTENSION: ICD-10-CM

## 2021-04-06 RX ORDER — AMLODIPINE BESYLATE 10 MG/1
TABLET ORAL
Qty: 90 TABLET | Refills: 2 | Status: SHIPPED | OUTPATIENT
Start: 2021-04-06 | End: 2021-04-08

## 2021-04-07 DIAGNOSIS — I10 ESSENTIAL HYPERTENSION: ICD-10-CM

## 2021-04-08 RX ORDER — AMLODIPINE BESYLATE 10 MG/1
TABLET ORAL
Qty: 90 TABLET | Refills: 2 | Status: SHIPPED | OUTPATIENT
Start: 2021-04-08 | End: 2021-04-12 | Stop reason: SDUPTHER

## 2021-04-12 ENCOUNTER — OFFICE VISIT (OUTPATIENT)
Dept: INTERNAL MEDICINE | Facility: CLINIC | Age: 86
End: 2021-04-12

## 2021-04-12 VITALS
TEMPERATURE: 97.8 F | WEIGHT: 169 LBS | SYSTOLIC BLOOD PRESSURE: 122 MMHG | BODY MASS INDEX: 31.1 KG/M2 | HEIGHT: 62 IN | DIASTOLIC BLOOD PRESSURE: 78 MMHG

## 2021-04-12 DIAGNOSIS — R60.0 LEG EDEMA, LEFT: ICD-10-CM

## 2021-04-12 DIAGNOSIS — Z00.00 MEDICARE ANNUAL WELLNESS VISIT, SUBSEQUENT: Primary | ICD-10-CM

## 2021-04-12 DIAGNOSIS — I10 ESSENTIAL HYPERTENSION: ICD-10-CM

## 2021-04-12 DIAGNOSIS — E78.49 OTHER HYPERLIPIDEMIA: Chronic | ICD-10-CM

## 2021-04-12 DIAGNOSIS — E78.5 HYPERLIPIDEMIA, UNSPECIFIED HYPERLIPIDEMIA TYPE: ICD-10-CM

## 2021-04-12 PROCEDURE — 99213 OFFICE O/P EST LOW 20 MIN: CPT | Performed by: INTERNAL MEDICINE

## 2021-04-12 PROCEDURE — G0439 PPPS, SUBSEQ VISIT: HCPCS | Performed by: INTERNAL MEDICINE

## 2021-04-12 RX ORDER — FUROSEMIDE 20 MG/1
20 TABLET ORAL DAILY
COMMUNITY
Start: 2021-03-18

## 2021-04-12 RX ORDER — AMLODIPINE BESYLATE 10 MG/1
10 TABLET ORAL DAILY
Qty: 90 TABLET | Refills: 2 | Status: SHIPPED | OUTPATIENT
Start: 2021-04-12 | End: 2022-05-02

## 2021-04-12 NOTE — PROGRESS NOTES
The ABCs of the Annual Wellness Visit  Subsequent Medicare Wellness Visit    Chief Complaint   Patient presents with   • Medicare Wellness-subsequent       Subjective   History of Present Illness:  Shiela Chaudhary is a 86 y.o. female who presents for a Subsequent Medicare Wellness Visit.  She had an echo this year with Dr. Montgomery and she reports that there were no significant changes. She denies chest pain or dyspnea. She has some edema in her left leg at the end of the day; it started 3-4 months ago. Dr. Montgomery started her on Lasix and it has helped her edema.  HEALTH RISK ASSESSMENT    Recent Hospitalizations:  No hospitalization(s) within the last year.    Current Medical Providers:  Patient Care Team:  Micheal Almaguer MD as PCP - General (Internal Medicine)    Smoking Status:  Social History     Tobacco Use   Smoking Status Never Smoker   Smokeless Tobacco Never Used       Alcohol Consumption:  Social History     Substance and Sexual Activity   Alcohol Use Yes    Comment: rare       Depression Screen:   PHQ-2/PHQ-9 Depression Screening 4/12/2021   Little interest or pleasure in doing things 0   Feeling down, depressed, or hopeless 0   Total Score 0       Fall Risk Screen:  STEADI Fall Risk Assessment was completed, and patient is at LOW risk for falls.Assessment completed on:4/12/2021    Health Habits and Functional and Cognitive Screening:  Functional & Cognitive Status 4/12/2021   Do you have difficulty preparing food and eating? No   Do you have difficulty bathing yourself, getting dressed or grooming yourself? No   Do you have difficulty using the toilet? No   Do you have difficulty moving around from place to place? No   Do you have trouble with steps or getting out of a bed or a chair? No   Current Diet Limited Junk Food   Dental Exam Up to date   Eye Exam Up to date   Exercise (times per week) 7 times per week   Current Exercise Activities Include Walking   Do you need help using the phone?  No   Are  you deaf or do you have serious difficulty hearing?  No   Do you need help with transportation? No   Do you need help shopping? No   Do you need help preparing meals?  No   Do you need help with housework?  No   Do you need help with laundry? No   Do you need help taking your medications? No   Do you need help managing money? No   Do you ever drive or ride in a car without wearing a seat belt? No   Have you felt unusual stress, anger or loneliness in the last month? No   Who do you live with? Child   If you need help, do you have trouble finding someone available to you? No   Have you been bothered in the last four weeks by sexual problems? No   Do you have difficulty concentrating, remembering or making decisions? No         Does the patient have evidence of cognitive impairment? No    Asprin use counseling:Does not need ASA (and currently is not on it)    Age-appropriate Screening Schedule:  Refer to the list below for future screening recommendations based on patient's age, sex and/or medical conditions. Orders for these recommended tests are listed in the plan section. The patient has been provided with a written plan.    Health Maintenance   Topic Date Due   • ZOSTER VACCINE (2 of 3) 02/26/2008   • LIPID PANEL  02/06/2021   • INFLUENZA VACCINE  08/01/2021   • DXA SCAN  06/30/2022   • TDAP/TD VACCINES (2 - Td) 04/01/2026          The following portions of the patient's history were reviewed and updated as appropriate: allergies, current medications, past family history, past medical history, past social history, past surgical history and problem list.    Outpatient Medications Prior to Visit   Medication Sig Dispense Refill   • acetaminophen (TYLENOL) 500 MG tablet Take 500 mg by mouth Every 6 (Six) Hours As Needed for Mild Pain .     • citalopram (CeleXA) 20 MG tablet TAKE ONE TABLET BY MOUTH DAILY 90 tablet 0   • furosemide (LASIX) 20 MG tablet      • losartan (COZAAR) 100 MG tablet TAKE ONE TABLET BY MOUTH  "DAILY 90 tablet 2   • melatonin 5 MG tablet tablet Take  by mouth.     • Multiple Vitamins-Minerals (CENTRUM SILVER PO) Take 1 tablet by mouth Daily.     • omeprazole (priLOSEC) 40 MG capsule Take 1 capsule by mouth Daily. 90 capsule 3   • psyllium (METAMUCIL) 58.6 % packet Take 1 packet by mouth.     • simvastatin (ZOCOR) 20 MG tablet Take 1 tablet by mouth Every Night. 90 tablet 3   • solifenacin (VESICARE) 5 MG tablet Take 5 mg by mouth.     • amLODIPine (NORVASC) 10 MG tablet TAKE ONE TABLET BY MOUTH DAILY 90 tablet 2     No facility-administered medications prior to visit.       Patient Active Problem List   Diagnosis   • Essential hypertension   • Other hyperlipidemia   • Depression   • Aortic insufficiency   • Osteopenia   • Thoracic spine pain       Advanced Care Planning:  ACP discussion was held with the patient during this visit. Patient has an advance directive (not in EMR), copy requested.    Review of Systems    Compared to one year ago, the patient feels her physical health is the same.  Compared to one year ago, the patient feels her mental health is the same.    Reviewed chart for potential of high risk medication in the elderly: yes  Reviewed chart for potential of harmful drug interactions in the elderly:yes    Objective         Vitals:    04/12/21 1540   BP: 122/78   Temp: 97.8 °F (36.6 °C)   Weight: 76.7 kg (169 lb)   Height: 157.5 cm (62\")       Body mass index is 30.91 kg/m².  Discussed the patient's BMI with her. The BMI is above average; BMI management plan is completed.    Physical Exam  Vitals reviewed.   Constitutional:       Appearance: She is well-developed.   HENT:      Head: Normocephalic and atraumatic.   Neck:      Vascular: No carotid bruit.      Comments: 1+ edema at left sock line. She has some varicose veins on the left.  Cardiovascular:      Rate and Rhythm: Normal rate and regular rhythm.      Heart sounds: Normal heart sounds, S1 normal and S2 normal.   Pulmonary:      " Effort: Pulmonary effort is normal.      Breath sounds: Normal breath sounds.   Skin:     General: Skin is warm.   Neurological:      Mental Status: She is alert.   Psychiatric:         Behavior: Behavior normal.               Assessment/Plan   Medicare Risks and Personalized Health Plan  CMS Preventative Services Quick Reference  Advance Directive Discussion  Immunizations Discussed/Encouraged (specific immunizations; Shingrix )    The above risks/problems have been discussed with the patient.  Pertinent information has been shared with the patient in the After Visit Summary.  Follow up plans and orders are seen below in the Assessment/Plan Section.    Diagnoses and all orders for this visit:    1. Medicare annual wellness visit, subsequent (Primary)    2. Essential hypertension  -     amLODIPine (NORVASC) 10 MG tablet; Take 1 tablet by mouth Daily.  Dispense: 90 tablet; Refill: 2  -     Basic Metabolic Panel    3. Other hyperlipidemia  -     Comprehensive Metabolic Panel; Future  -     Lipid Panel With / Chol / HDL Ratio; Future    4. Leg edema, left  -     Duplex Venous Lower Extremity - Left CAR; Future      Follow Up:  Return in about 6 months (around 10/12/2021) for Lab Today, Lab Before FUP.     An After Visit Summary and PPPS were given to the patient.         Recc Shingrix at the drug store. BP is good. Set up doppler left leg; discussed getting medium strength compression hose.

## 2021-04-13 DIAGNOSIS — I10 ESSENTIAL HYPERTENSION: Primary | ICD-10-CM

## 2021-04-13 LAB
BUN SERPL-MCNC: 16 MG/DL (ref 8–27)
BUN/CREAT SERPL: 22 (ref 12–28)
CALCIUM SERPL-MCNC: 9.5 MG/DL (ref 8.7–10.3)
CHLORIDE SERPL-SCNC: 99 MMOL/L (ref 96–106)
CO2 SERPL-SCNC: 21 MMOL/L (ref 20–29)
CREAT SERPL-MCNC: 0.73 MG/DL (ref 0.57–1)
GLUCOSE SERPL-MCNC: NORMAL MG/DL
POTASSIUM SERPL-SCNC: NORMAL MMOL/L
SODIUM SERPL-SCNC: 137 MMOL/L (ref 134–144)

## 2021-04-13 RX ORDER — SIMVASTATIN 20 MG
TABLET ORAL
Qty: 90 TABLET | Refills: 2 | Status: SHIPPED | OUTPATIENT
Start: 2021-04-13 | End: 2022-01-17

## 2021-04-16 LAB
BUN SERPL-MCNC: 15 MG/DL (ref 8–23)
BUN/CREAT SERPL: 23.4 (ref 7–25)
CALCIUM SERPL-MCNC: 9.7 MG/DL (ref 8.6–10.5)
CHLORIDE SERPL-SCNC: 99 MMOL/L (ref 98–107)
CO2 SERPL-SCNC: 29.2 MMOL/L (ref 22–29)
CREAT SERPL-MCNC: 0.64 MG/DL (ref 0.57–1)
GLUCOSE SERPL-MCNC: 77 MG/DL (ref 65–99)
POTASSIUM SERPL-SCNC: 4.8 MMOL/L (ref 3.5–5.2)
SODIUM SERPL-SCNC: 136 MMOL/L (ref 136–145)

## 2021-04-28 DIAGNOSIS — K21.9 GASTROESOPHAGEAL REFLUX DISEASE: ICD-10-CM

## 2021-04-29 RX ORDER — OMEPRAZOLE 40 MG/1
CAPSULE, DELAYED RELEASE ORAL
Qty: 90 CAPSULE | Refills: 2 | Status: SHIPPED | OUTPATIENT
Start: 2021-04-29 | End: 2022-02-08

## 2021-04-30 ENCOUNTER — HOSPITAL ENCOUNTER (OUTPATIENT)
Dept: CARDIOLOGY | Facility: HOSPITAL | Age: 86
Discharge: HOME | End: 2021-04-30
Admitting: INTERNAL MEDICINE

## 2021-04-30 DIAGNOSIS — R60.0 LEG EDEMA, LEFT: ICD-10-CM

## 2021-04-30 LAB
BH CV LOWER VASCULAR LEFT COMMON FEMORAL AUGMENT: NORMAL
BH CV LOWER VASCULAR LEFT COMMON FEMORAL COMPETENT: NORMAL
BH CV LOWER VASCULAR LEFT COMMON FEMORAL COMPRESS: NORMAL
BH CV LOWER VASCULAR LEFT COMMON FEMORAL PHASIC: NORMAL
BH CV LOWER VASCULAR LEFT COMMON FEMORAL SPONT: NORMAL
BH CV LOWER VASCULAR LEFT DISTAL FEMORAL COMPRESS: NORMAL
BH CV LOWER VASCULAR LEFT GASTRONEMIUS COMPRESS: NORMAL
BH CV LOWER VASCULAR LEFT GREATER SAPH AK COMPRESS: NORMAL
BH CV LOWER VASCULAR LEFT GREATER SAPH BK COMPRESS: NORMAL
BH CV LOWER VASCULAR LEFT LESSER SAPH COMPRESS: NORMAL
BH CV LOWER VASCULAR LEFT MID FEMORAL AUGMENT: NORMAL
BH CV LOWER VASCULAR LEFT MID FEMORAL COMPETENT: NORMAL
BH CV LOWER VASCULAR LEFT MID FEMORAL COMPRESS: NORMAL
BH CV LOWER VASCULAR LEFT MID FEMORAL PHASIC: NORMAL
BH CV LOWER VASCULAR LEFT MID FEMORAL SPONT: NORMAL
BH CV LOWER VASCULAR LEFT PERONEAL COMPRESS: NORMAL
BH CV LOWER VASCULAR LEFT POPLITEAL AUGMENT: NORMAL
BH CV LOWER VASCULAR LEFT POPLITEAL COMPETENT: NORMAL
BH CV LOWER VASCULAR LEFT POPLITEAL COMPRESS: NORMAL
BH CV LOWER VASCULAR LEFT POPLITEAL PHASIC: NORMAL
BH CV LOWER VASCULAR LEFT POPLITEAL SPONT: NORMAL
BH CV LOWER VASCULAR LEFT POSTERIOR TIBIAL COMPRESS: NORMAL
BH CV LOWER VASCULAR LEFT PROFUNDA FEMORAL COMPRESS: NORMAL
BH CV LOWER VASCULAR LEFT PROXIMAL FEMORAL COMPRESS: NORMAL
BH CV LOWER VASCULAR LEFT SAPHENOFEMORAL JUNCTION COMPRESS: NORMAL
BH CV LOWER VASCULAR RIGHT COMMON FEMORAL AUGMENT: NORMAL
BH CV LOWER VASCULAR RIGHT COMMON FEMORAL COMPETENT: NORMAL
BH CV LOWER VASCULAR RIGHT COMMON FEMORAL COMPRESS: NORMAL
BH CV LOWER VASCULAR RIGHT COMMON FEMORAL PHASIC: NORMAL
BH CV LOWER VASCULAR RIGHT COMMON FEMORAL SPONT: NORMAL
MAXIMAL PREDICTED HEART RATE: 134 BPM
STRESS TARGET HR: 114 BPM

## 2021-04-30 PROCEDURE — 93971 EXTREMITY STUDY: CPT

## 2021-08-28 ENCOUNTER — READMISSION MANAGEMENT (OUTPATIENT)
Dept: CALL CENTER | Facility: HOSPITAL | Age: 86
End: 2021-08-28

## 2021-08-29 NOTE — OUTREACH NOTE
Prep Survey      Responses   [facility] facility patient discharged from?  Non-BH   Is LACE score < 7 ?  Non-BH Discharge   Emergency Room discharge w/ pulse ox?  No   Eligibility  [facility]   Date of Admission  08/22/21   Date of Discharge  08/28/21   Discharge diagnosis  Closed Hip Fx   Does the patient have one of the following disease processes/diagnoses(primary or secondary)?  General Surgery   Prep survey completed?  Yes          Neela Drake RN

## 2021-08-30 ENCOUNTER — TRANSITIONAL CARE MANAGEMENT TELEPHONE ENCOUNTER (OUTPATIENT)
Dept: CALL CENTER | Facility: HOSPITAL | Age: 86
End: 2021-08-30

## 2021-08-30 ENCOUNTER — TELEPHONE (OUTPATIENT)
Dept: INTERNAL MEDICINE | Facility: CLINIC | Age: 86
End: 2021-08-30

## 2021-08-30 NOTE — TELEPHONE ENCOUNTER
Caller: Millie Chaudhary    Relationship: Emergency Contact    Best call back number: [phone]    What medication are you requesting: SOMETHING FOR PAIN    What are your current symptoms: BACK PAIN STARTED DUE TO THE BED IN THE REHAB FACILITY FOR HER HIP SURGERY.  PATIENT HAD SCOLIOSIS PRIOR AND THIS HAS      AGGRAVATED THIS MEDICAL CONDITION.    How long have you been experiencing symptoms: 8/25    Have you had these symptoms before:    [] Yes  [x] No    Have you been treated for these symptoms before:   [] Yes  [x] No    If a prescription is needed, what is your preferred pharmacy and phone number: [facility] [address] [place] [address] RD. - [phone]  - [phone] FX

## 2021-08-30 NOTE — OUTREACH NOTE
Call Center TCM Note      Responses   [facility] patient discharged from?  Non-   Does the patient have one of the following disease processes/diagnoses(primary or secondary)?  General Surgery   TCM attempt successful?  Yes   Call start time  1149   Call end time  1152   Discharge diagnosis  Closed Hip Fx   Meds reviewed with patient/caregiver?  Yes   Prescription comments  DAUGHTER STATES PATIENT DID NOT GET SENT HOME FROM REHAB WITH A PRESCRIPTION FOR PAIN MEDICATION AND SHE IS HAVING PAIN. DAUGHTER STATES SHE HAS A CALL OUT TO SURGEON REGARDING THIS ISSUE, AND HOME HEALTH PT IS CURRENTLY IN THE HOME. DAUGHTER IS GOING TO ASK PT IF THEY WILL REACH OUT TO SURGEON'S OFFICE TO INQUIRE ABOUT A PRESCRIPTION FOR PAIN MEDICATION.    Is the patient taking all medications as directed (includes completed medication regime)?  Yes   Does the patient have a primary care provider?   Yes   Does the patient have an appointment with their PCP within 7 days of discharge?  Greater than 7 days   Comments regarding PCP  PCP APPOINTMENT IS 10/12/21. DAUGHTER IS GOING TO SEE IF PCP WANTS A SOONER APPT.    Nursing Interventions  Verified appointment date/time/provider   Has the patient kept scheduled appointments due by today?  N/A   Has home health visited the patient within 72 hours of discharge?  N/A   Psychosocial issues?  No   Did the patient receive a copy of their discharge instructions?  Yes   Nursing interventions  Reviewed instructions with patient   What is the patient's perception of their health status since discharge?  Improving   Is the patient/caregiver able to teach back signs and symptoms related to disease process for when to call PCP?  Yes   Is the patient/caregiver able to teach back signs and symptoms related to disease process for when to call 911?  Yes   Is the patient/caregiver able to teach back the hierarchy of who to call/visit for symptoms/problems? PCP, Specialist, Home health nurse, Urgent Care, ED,  911  Yes   If the patient is a current smoker, are they able to teach back resources for cessation?  Not a smoker   TCM call completed?  Yes          Breanna Luz LPN    8/30/2021, 12:03 EDT

## 2021-09-10 ENCOUNTER — OFFICE VISIT (OUTPATIENT)
Dept: INTERNAL MEDICINE | Facility: CLINIC | Age: 86
End: 2021-09-10

## 2021-09-10 VITALS
WEIGHT: 161.4 LBS | TEMPERATURE: 97.8 F | HEIGHT: 62 IN | DIASTOLIC BLOOD PRESSURE: 76 MMHG | SYSTOLIC BLOOD PRESSURE: 132 MMHG | BODY MASS INDEX: 29.7 KG/M2

## 2021-09-10 DIAGNOSIS — S72.001A CLOSED DISPLACED FRACTURE OF RIGHT FEMORAL NECK (HCC): Primary | ICD-10-CM

## 2021-09-10 DIAGNOSIS — I10 ESSENTIAL HYPERTENSION: Chronic | ICD-10-CM

## 2021-09-10 PROCEDURE — 99214 OFFICE O/P EST MOD 30 MIN: CPT | Performed by: INTERNAL MEDICINE

## 2021-09-10 RX ORDER — BACLOFEN 5 MG/1
TABLET ORAL
COMMUNITY
Start: 2021-08-31 | End: 2022-10-27

## 2021-09-10 RX ORDER — ASPIRIN 81 MG/1
81 TABLET ORAL
COMMUNITY
Start: 2021-08-21

## 2021-09-10 RX ORDER — MELATONIN
4 DAILY
COMMUNITY
Start: 2021-08-31

## 2021-09-10 RX ORDER — OXYBUTYNIN CHLORIDE 5 MG/1
TABLET ORAL
COMMUNITY
Start: 2021-08-31 | End: 2022-09-16

## 2021-09-10 NOTE — PROGRESS NOTES
Subjective        Chief Complaint   Patient presents with   • Hospital Follow Up Visit           Shiela Chaudhary is a 87 y.o. female who presents for    Patient Active Problem List   Diagnosis   • Essential hypertension   • Other hyperlipidemia   • Depression   • Aortic insufficiency   • Osteopenia   • Thoracic spine pain   • Closed displaced fracture of right femoral neck (CMS/HCC)       History of Present Illness     She fell and fractured her right hip over a month ago. She has not had any complications. Her back hurts from the mattress at the nursing home. It is not any worse than it was. She sees Dr. Hughes later this month. She denies chest pain or dyspnea. Her appetite is slowly improving.  She was wearing crocks and they stuck to the floor. Her foot turned but the shoe didn't. PT has told her that she is progressing well.   Allergies   Allergen Reactions   • Lisinopril Cough   • Nsaids Nausea And Vomiting   • Fosamax [Alendronate] Other (See Comments)     Stomach ache       Current Outpatient Medications on File Prior to Visit   Medication Sig Dispense Refill   • acetaminophen (TYLENOL) 500 MG tablet Take 500 mg by mouth Every 6 (Six) Hours As Needed for Mild Pain .     • amLODIPine (NORVASC) 10 MG tablet Take 1 tablet by mouth Daily. 90 tablet 2   • aspirin (aspirin) 81 MG EC tablet Take 81 mg by mouth.     • Baclofen 5 MG tablet      • cholecalciferol (Cholecalciferol) 25 MCG (1000 UT) tablet Take 4 tablets by mouth Daily.     • citalopram (CeleXA) 20 MG tablet TAKE ONE TABLET BY MOUTH DAILY 90 tablet 0   • furosemide (LASIX) 20 MG tablet      • losartan (COZAAR) 100 MG tablet TAKE ONE TABLET BY MOUTH DAILY 90 tablet 2   • melatonin 5 MG tablet tablet Take  by mouth.     • omeprazole (priLOSEC) 40 MG capsule TAKE ONE CAPSULE BY MOUTH DAILY 90 capsule 2   • oxybutynin (DITROPAN) 5 MG tablet      • psyllium (METAMUCIL) 58.6 % packet Take 1 packet by mouth.     • simvastatin (ZOCOR) 20 MG tablet TAKE ONE  TABLET BY MOUTH ONCE NIGHTLY 90 tablet 2   • Multiple Vitamins-Minerals (CENTRUM SILVER PO) Take 1 tablet by mouth Daily.     • [DISCONTINUED] solifenacin (VESICARE) 5 MG tablet Take 5 mg by mouth.       No current facility-administered medications on file prior to visit.       Past Medical History:   Diagnosis Date   • Aortic insufficiency 2015    moderate   • BCC (basal cell carcinoma of skin)    • Diverticulosis 2012   • History of positive PPD, untreated    • Hyperactivity of bladder    • LVH (left ventricular hypertrophy)    • Osteopenia    • Pulmonary HTN (CMS/HCC)    • Right hip pain    • Scoliosis    • SOB (shortness of breath)    • Tricuspid regurgitation        Past Surgical History:   Procedure Laterality Date   • BREAST SURGERY     • CATARACT EXTRACTION     • COLONOSCOPY  2013   • DILATATION AND CURETTAGE     • PARTIAL HIP ARTHROPLASTY Right 08/2021       Family History   Problem Relation Age of Onset   • Cancer Mother         vaginal   • Stroke Father    • Cancer Sister         liver   • Diabetes Brother    • Stroke Brother    • Hypertension Brother        Social History     Socioeconomic History   • Marital status:      Spouse name: Not on file   • Number of children: Not on file   • Years of education: Not on file   • Highest education level: Not on file   Tobacco Use   • Smoking status: Never Smoker   • Smokeless tobacco: Never Used   Substance and Sexual Activity   • Alcohol use: Yes     Comment: rare   • Drug use: No   • Sexual activity: Defer           The following portions of the patient's history were reviewed and updated as appropriate: problem list, allergies, current medications, past medical history, past family history, past social history and past surgical history.    Review of Systems    Immunization History   Administered Date(s) Administered   • COVID-19 (PFIZER) 02/24/2021, 03/17/2021   • FLUAD TRI 65YR+ 09/24/2019   • Fluad Quad 65+ 09/17/2020   • Fluzone High Dose =>65 Years  "(Vaxcare ONLY) 09/29/2017, 09/30/2018   • Hepatitis A 05/01/2018, 01/06/2019   • Pneumococcal Conjugate 13-Valent (PCV13) 03/31/2015   • Pneumococcal Polysaccharide (PPSV23) 01/01/2001   • Tdap 04/01/2016   • Zostavax 01/01/2008       Objective   Vitals:    09/10/21 1140   BP: 132/76   Temp: 97.8 °F (36.6 °C)   Weight: 73.2 kg (161 lb 6.4 oz)   Height: 157.5 cm (62\")     Body mass index is 29.52 kg/m².  Physical Exam  Vitals reviewed.   Constitutional:       Appearance: She is well-developed.   HENT:      Head: Normocephalic and atraumatic.   Cardiovascular:      Rate and Rhythm: Normal rate and regular rhythm.      Heart sounds: Normal heart sounds, S1 normal and S2 normal.   Pulmonary:      Effort: Pulmonary effort is normal.      Breath sounds: Normal breath sounds.   Musculoskeletal:      Comments: No edema    Walks with cane   Skin:     General: Skin is warm.   Neurological:      Mental Status: She is alert.   Psychiatric:         Behavior: Behavior normal.         Procedures    Assessment/Plan   Diagnoses and all orders for this visit:    1. Closed displaced fracture of right femoral neck (CMS/HCC) (Primary)    2. Essential hypertension        Reviewed hospital discharge from [facility]. I do think she is not giving herself enough credit or time to heal. She is doing well with PT and her daughter lives with her. She had a DEXA last year.         Return in about 4 weeks (around 10/8/2021).  "

## 2021-09-24 ENCOUNTER — TELEPHONE (OUTPATIENT)
Dept: INTERNAL MEDICINE | Facility: CLINIC | Age: 86
End: 2021-09-24

## 2021-09-24 NOTE — TELEPHONE ENCOUNTER
PATIENT STATES:[facility]  IS CALLING TO SEE IF THE ORDER WAS PUT IN FOR PATIENT TO GET A WALKER WITH FOUR WHEELS AND A SEAT  SENT TO [facility] FAX NUMBER [phone] PLEASE ADVISE      PATIENT CAN BE REACHED ON: [phone]

## 2021-10-04 DIAGNOSIS — E78.49 OTHER HYPERLIPIDEMIA: Chronic | ICD-10-CM

## 2021-10-04 DIAGNOSIS — F32.A DEPRESSION, UNSPECIFIED DEPRESSION TYPE: ICD-10-CM

## 2021-10-04 RX ORDER — CITALOPRAM 20 MG/1
TABLET ORAL
Qty: 90 TABLET | Refills: 0 | Status: SHIPPED | OUTPATIENT
Start: 2021-10-04 | End: 2022-01-07

## 2021-10-06 ENCOUNTER — LAB (OUTPATIENT)
Dept: LAB | Facility: HOSPITAL | Age: 86
End: 2021-10-06

## 2021-10-06 LAB
ALBUMIN SERPL-MCNC: 4.4 G/DL (ref 3.5–5.2)
ALBUMIN/GLOB SERPL: 1.5 G/DL
ALP SERPL-CCNC: 95 U/L (ref 39–117)
ALT SERPL W P-5'-P-CCNC: 13 U/L (ref 1–33)
ANION GAP SERPL CALCULATED.3IONS-SCNC: 10.4 MMOL/L (ref 5–15)
AST SERPL-CCNC: 18 U/L (ref 1–32)
BILIRUB SERPL-MCNC: 0.3 MG/DL (ref 0–1.2)
BUN SERPL-MCNC: 14 MG/DL (ref 8–23)
BUN/CREAT SERPL: 23.7 (ref 7–25)
CALCIUM SPEC-SCNC: 9.9 MG/DL (ref 8.6–10.5)
CHLORIDE SERPL-SCNC: 96 MMOL/L (ref 98–107)
CHOLEST SERPL-MCNC: 148 MG/DL (ref 0–200)
CO2 SERPL-SCNC: 26.6 MMOL/L (ref 22–29)
CREAT SERPL-MCNC: 0.59 MG/DL (ref 0.57–1)
GFR SERPL CREATININE-BSD FRML MDRD: 96 ML/MIN/1.73
GLOBULIN UR ELPH-MCNC: 3 GM/DL
GLUCOSE SERPL-MCNC: 91 MG/DL (ref 65–99)
HDLC SERPL QL: 2.96
HDLC SERPL-MCNC: 50 MG/DL (ref 40–60)
LDLC SERPL CALC-MCNC: 72 MG/DL (ref 0–100)
POTASSIUM SERPL-SCNC: 4.3 MMOL/L (ref 3.5–5.2)
PROT SERPL-MCNC: 7.4 G/DL (ref 6–8.5)
SODIUM SERPL-SCNC: 133 MMOL/L (ref 136–145)
TRIGL SERPL-MCNC: 150 MG/DL (ref 0–150)
VLDLC SERPL-MCNC: 26 MG/DL (ref 5–40)

## 2021-10-06 PROCEDURE — 80061 LIPID PANEL: CPT | Performed by: INTERNAL MEDICINE

## 2021-10-06 PROCEDURE — 36415 COLL VENOUS BLD VENIPUNCTURE: CPT

## 2021-10-06 PROCEDURE — 80053 COMPREHEN METABOLIC PANEL: CPT | Performed by: INTERNAL MEDICINE

## 2021-10-07 ENCOUNTER — OFFICE VISIT (OUTPATIENT)
Dept: INTERNAL MEDICINE | Facility: CLINIC | Age: 86
End: 2021-10-07

## 2021-10-07 VITALS
TEMPERATURE: 97.8 F | WEIGHT: 158 LBS | SYSTOLIC BLOOD PRESSURE: 114 MMHG | BODY MASS INDEX: 29.08 KG/M2 | HEIGHT: 62 IN | DIASTOLIC BLOOD PRESSURE: 80 MMHG

## 2021-10-07 DIAGNOSIS — E78.49 OTHER HYPERLIPIDEMIA: Chronic | ICD-10-CM

## 2021-10-07 DIAGNOSIS — E87.1 HYPONATREMIA: ICD-10-CM

## 2021-10-07 DIAGNOSIS — I10 ESSENTIAL HYPERTENSION: Primary | Chronic | ICD-10-CM

## 2021-10-07 PROCEDURE — 99214 OFFICE O/P EST MOD 30 MIN: CPT | Performed by: INTERNAL MEDICINE

## 2021-10-07 NOTE — PROGRESS NOTES
Subjective        Chief Complaint   Patient presents with   • Hypertension           Shiela Chaudhary is a 87 y.o. female who presents for    Patient Active Problem List   Diagnosis   • Essential hypertension   • Other hyperlipidemia   • Depression   • Aortic insufficiency   • Osteopenia   • Thoracic spine pain   • Closed displaced fracture of right femoral neck (HCC)       History of Present Illness     She denies chest pain or dyspnea. She still has home PT. Her BP runs 126/80 at home. She does not drink a lot of water.  Allergies   Allergen Reactions   • Lisinopril Cough   • Nsaids Nausea And Vomiting   • Fosamax [Alendronate] Other (See Comments)     Stomach ache       Current Outpatient Medications on File Prior to Visit   Medication Sig Dispense Refill   • acetaminophen (TYLENOL) 500 MG tablet Take 500 mg by mouth Every 6 (Six) Hours As Needed for Mild Pain .     • amLODIPine (NORVASC) 10 MG tablet Take 1 tablet by mouth Daily. 90 tablet 2   • aspirin (aspirin) 81 MG EC tablet Take 81 mg by mouth.     • Baclofen 5 MG tablet      • cholecalciferol (Cholecalciferol) 25 MCG (1000 UT) tablet Take 4 tablets by mouth Daily.     • citalopram (CeleXA) 20 MG tablet TAKE ONE TABLET BY MOUTH DAILY 90 tablet 0   • furosemide (LASIX) 20 MG tablet      • losartan (COZAAR) 100 MG tablet TAKE ONE TABLET BY MOUTH DAILY 90 tablet 2   • melatonin 5 MG tablet tablet Take  by mouth.     • Multiple Vitamins-Minerals (CENTRUM SILVER PO) Take 1 tablet by mouth Daily.     • omeprazole (priLOSEC) 40 MG capsule TAKE ONE CAPSULE BY MOUTH DAILY 90 capsule 2   • oxybutynin (DITROPAN) 5 MG tablet      • psyllium (METAMUCIL) 58.6 % packet Take 1 packet by mouth.     • simvastatin (ZOCOR) 20 MG tablet TAKE ONE TABLET BY MOUTH ONCE NIGHTLY 90 tablet 2     No current facility-administered medications on file prior to visit.       Past Medical History:   Diagnosis Date   • Aortic insufficiency 2015    moderate   • BCC (basal cell carcinoma of  "skin)    • Diverticulosis 2012   • History of positive PPD, untreated    • Hyperactivity of bladder    • LVH (left ventricular hypertrophy)    • Osteopenia    • Pulmonary HTN (HCC)    • Right hip pain    • Scoliosis    • SOB (shortness of breath)    • Tricuspid regurgitation        Past Surgical History:   Procedure Laterality Date   • BREAST SURGERY     • CATARACT EXTRACTION     • COLONOSCOPY  2013   • DILATATION AND CURETTAGE     • PARTIAL HIP ARTHROPLASTY Right 08/2021       Family History   Problem Relation Age of Onset   • Cancer Mother         vaginal   • Stroke Father    • Cancer Sister         liver   • Diabetes Brother    • Stroke Brother    • Hypertension Brother        Social History     Socioeconomic History   • Marital status:      Spouse name: Not on file   • Number of children: Not on file   • Years of education: Not on file   • Highest education level: Not on file   Tobacco Use   • Smoking status: Never Smoker   • Smokeless tobacco: Never Used   Substance and Sexual Activity   • Alcohol use: Yes     Comment: rare   • Drug use: No   • Sexual activity: Defer           The following portions of the patient's history were reviewed and updated as appropriate: problem list, allergies, current medications, past medical history, past family history, past social history and past surgical history.    Review of Systems    Immunization History   Administered Date(s) Administered   • COVID-19 (PFIZER) 02/24/2021, 03/17/2021, 09/23/2021   • FLUAD TRI 65YR+ 09/24/2019   • Fluad Quad 65+ 09/17/2020   • Fluzone High Dose =>65 Years ([employer] ONLY) 09/29/2017, 09/30/2018   • Hepatitis A 05/01/2018, 01/06/2019   • Pneumococcal Conjugate 13-Valent (PCV13) 03/31/2015   • Pneumococcal Polysaccharide (PPSV23) 01/01/2001   • Tdap 04/01/2016   • Zostavax 01/01/2008       Objective   Vitals:    10/07/21 1451   BP: 114/80   Temp: 97.8 °F (36.6 °C)   Weight: 71.7 kg (158 lb)   Height: 157.5 cm (62\")     Body mass index is " 28.9 kg/m².  Physical Exam  Vitals reviewed.   Constitutional:       Appearance: She is well-developed.   HENT:      Head: Normocephalic and atraumatic.   Cardiovascular:      Rate and Rhythm: Normal rate and regular rhythm.      Heart sounds: Normal heart sounds, S1 normal and S2 normal.   Pulmonary:      Effort: Pulmonary effort is normal.      Breath sounds: Normal breath sounds.   Skin:     General: Skin is warm.   Neurological:      Mental Status: She is alert.   Psychiatric:         Behavior: Behavior normal.         Procedures    Assessment/Plan   Diagnoses and all orders for this visit:    1. Essential hypertension (Primary)  Comments:  BP is good    2. Other hyperlipidemia  Comments:  LDL is at goal    3. Hyponatremia  Comments:  It has fluctuated in the past.  Orders:  -     Basic Metabolic Panel; Future               Recc flu shot at drug store. Reviewed cmp and flp. BP is good.  Return in about 6 months (around 4/13/2022) for Medicare Wellness, Lab Before FUP.

## 2022-01-06 DIAGNOSIS — F32.A DEPRESSION, UNSPECIFIED DEPRESSION TYPE: ICD-10-CM

## 2022-01-07 RX ORDER — CITALOPRAM 20 MG/1
TABLET ORAL
Qty: 90 TABLET | Refills: 3 | Status: SHIPPED | OUTPATIENT
Start: 2022-01-07 | End: 2023-01-09

## 2022-01-16 DIAGNOSIS — E78.5 HYPERLIPIDEMIA, UNSPECIFIED HYPERLIPIDEMIA TYPE: ICD-10-CM

## 2022-01-17 RX ORDER — SIMVASTATIN 20 MG
TABLET ORAL
Qty: 90 TABLET | Refills: 2 | Status: SHIPPED | OUTPATIENT
Start: 2022-01-17 | End: 2022-10-17

## 2022-02-08 DIAGNOSIS — K21.9 GASTROESOPHAGEAL REFLUX DISEASE: ICD-10-CM

## 2022-02-08 RX ORDER — OMEPRAZOLE 40 MG/1
CAPSULE, DELAYED RELEASE ORAL
Qty: 90 CAPSULE | Refills: 2 | Status: SHIPPED | OUTPATIENT
Start: 2022-02-08 | End: 2022-11-16 | Stop reason: SDUPTHER

## 2022-03-07 DIAGNOSIS — I10 ESSENTIAL HYPERTENSION: ICD-10-CM

## 2022-03-07 RX ORDER — LOSARTAN POTASSIUM 100 MG/1
TABLET ORAL
Qty: 90 TABLET | Refills: 0 | Status: SHIPPED | OUTPATIENT
Start: 2022-03-07 | End: 2022-06-02

## 2022-03-31 ENCOUNTER — TELEPHONE (OUTPATIENT)
Dept: INTERNAL MEDICINE | Facility: CLINIC | Age: 87
End: 2022-03-31

## 2022-03-31 NOTE — TELEPHONE ENCOUNTER
Hub staff attempted to follow warm transfer process and was unsuccessful     Caller: Shiela Chaudhary    Relationship to patient: Self    Best call back number: [phone]    Patient is needing: PATIENT WOULD LIKE TO KNOW IF HER LABS NEXT WEEK ARE FASTING LABS.  PLEASE CALL AND ADVISE

## 2022-04-14 ENCOUNTER — OFFICE VISIT (OUTPATIENT)
Dept: INTERNAL MEDICINE | Facility: CLINIC | Age: 87
End: 2022-04-14

## 2022-04-14 VITALS
HEIGHT: 62 IN | SYSTOLIC BLOOD PRESSURE: 116 MMHG | DIASTOLIC BLOOD PRESSURE: 80 MMHG | WEIGHT: 148 LBS | BODY MASS INDEX: 27.23 KG/M2 | TEMPERATURE: 97.8 F

## 2022-04-14 DIAGNOSIS — E78.49 OTHER HYPERLIPIDEMIA: Chronic | ICD-10-CM

## 2022-04-14 DIAGNOSIS — Z00.00 ENCOUNTER FOR SUBSEQUENT ANNUAL WELLNESS VISIT (AWV) IN MEDICARE PATIENT: Primary | ICD-10-CM

## 2022-04-14 DIAGNOSIS — I10 ESSENTIAL HYPERTENSION: Chronic | ICD-10-CM

## 2022-04-14 PROCEDURE — 1159F MED LIST DOCD IN RCRD: CPT | Performed by: INTERNAL MEDICINE

## 2022-04-14 PROCEDURE — 1126F AMNT PAIN NOTED NONE PRSNT: CPT | Performed by: INTERNAL MEDICINE

## 2022-04-14 PROCEDURE — G0439 PPPS, SUBSEQ VISIT: HCPCS | Performed by: INTERNAL MEDICINE

## 2022-04-14 PROCEDURE — 1170F FXNL STATUS ASSESSED: CPT | Performed by: INTERNAL MEDICINE

## 2022-04-14 NOTE — PROGRESS NOTES
The ABCs of the Annual Wellness Visit  Subsequent Medicare Wellness Visit    Chief Complaint   Patient presents with   • Medicare Wellness-subsequent      Subjective    History of Present Illness:  Shiela Chaudhary is a 87 y.o. female who presents for a Subsequent Medicare Wellness Visit.  She saw Dr. Montgomery and she took Isosorbide for a day; she felt dizzy with it. She was to have an echo and a stress test. Her dyspnea fluctuates. She denies PND, orthopnea, or chest pain.  The following portions of the patient's history were reviewed and   updated as appropriate: allergies, current medications, past family history, past medical history, past social history, past surgical history and problem list.    Compared to one year ago, the patient feels her physical   health is the same.    Compared to one year ago, the patient feels her mental   health is the same.    Recent Hospitalizations:  She was admitted within the past 365 days at [facility].       Current Medical Providers:  Patient Care Team:  Micheal Almaguer MD as PCP - General (Internal Medicine)    Outpatient Medications Prior to Visit   Medication Sig Dispense Refill   • acetaminophen (TYLENOL) 500 MG tablet Take 500 mg by mouth Every 6 (Six) Hours As Needed for Mild Pain .     • amLODIPine (NORVASC) 10 MG tablet Take 1 tablet by mouth Daily. 90 tablet 2   • aspirin 81 MG EC tablet Take 81 mg by mouth.     • Baclofen 5 MG tablet      • cholecalciferol (VITAMIN D3) 25 MCG (1000 UT) tablet Take 4 tablets by mouth Daily.     • citalopram (CeleXA) 20 MG tablet TAKE ONE TABLET BY MOUTH DAILY 90 tablet 3   • furosemide (LASIX) 20 MG tablet      • losartan (COZAAR) 100 MG tablet TAKE ONE TABLET BY MOUTH DAILY 90 tablet 0   • melatonin 5 MG tablet tablet Take  by mouth.     • Multiple Vitamins-Minerals (CENTRUM SILVER PO) Take 1 tablet by mouth Daily.     • omeprazole (priLOSEC) 40 MG capsule TAKE ONE CAPSULE BY MOUTH DAILY 90 capsule 2   • oxybutynin  "(DITROPAN) 5 MG tablet      • psyllium (METAMUCIL) 58.6 % packet Take 1 packet by mouth.     • simvastatin (ZOCOR) 20 MG tablet TAKE ONE TABLET BY MOUTH ONCE NIGHTLY 90 tablet 2     No facility-administered medications prior to visit.       No opioid medication identified on active medication list. I have reviewed chart for other potential  high risk medication/s and harmful drug interactions in the elderly.          Aspirin is on active medication list. Aspirin use is indicated based on review of current medical condition/s. Pros and cons of this therapy have been discussed today. Benefits of this medication outweigh potential harm.  Patient has been encouraged to continue taking this medication.  .      Patient Active Problem List   Diagnosis   • Essential hypertension   • Other hyperlipidemia   • Depression   • Aortic insufficiency   • Osteopenia   • Thoracic spine pain   • Closed displaced fracture of right femoral neck (HCC)     Advance Care Planning  Advance Directive is not on file.  ACP discussion was held with the patient during this visit. Patient has an advance directive (not in EMR), copy requested.          Objective    Vitals:    04/14/22 1315   BP: 116/80   Temp: 97.8 °F (36.6 °C)   Weight: 67.1 kg (148 lb)   Height: 157.5 cm (62.01\")     BMI Readings from Last 1 Encounters:   04/14/22 27.06 kg/m²   BMI is above normal parameters. Recommendations include: exercise counseling    Does the patient have evidence of cognitive impairment? No    Physical Exam  Vitals reviewed.   Constitutional:       Appearance: She is well-developed.   HENT:      Head: Normocephalic and atraumatic.   Neck:      Vascular: No carotid bruit.   Cardiovascular:      Rate and Rhythm: Normal rate and regular rhythm.      Heart sounds: Normal heart sounds, S1 normal and S2 normal.   Pulmonary:      Effort: Pulmonary effort is normal.      Breath sounds: Normal breath sounds.   Skin:     General: Skin is warm.   Neurological:      " Mental Status: She is alert.   Psychiatric:         Behavior: Behavior normal.                 HEALTH RISK ASSESSMENT    Smoking Status:  Social History     Tobacco Use   Smoking Status Never Smoker   Smokeless Tobacco Never Used     Alcohol Consumption:  Social History     Substance and Sexual Activity   Alcohol Use Yes    Comment: rare     Fall Risk Screen:    SHERITA Fall Risk Assessment was completed, and patient is at HIGH risk for falls. Assessment completed on:4/14/2022    Depression Screening:  PHQ-2/PHQ-9 Depression Screening 4/14/2022   Retired PHQ-9 Total Score -   Retired Total Score -   Little Interest or Pleasure in Doing Things 0-->not at all   Feeling Down, Depressed or Hopeless 0-->not at all   PHQ-9: Brief Depression Severity Measure Score 0       Health Habits and Functional and Cognitive Screening:  Functional & Cognitive Status 4/14/2022   Do you have difficulty preparing food and eating? No   Do you have difficulty bathing yourself, getting dressed or grooming yourself? No   Do you have difficulty using the toilet? No   Do you have difficulty moving around from place to place? No   Do you have trouble with steps or getting out of a bed or a chair? No   Current Diet Well Balanced Diet   Dental Exam Up to date   Eye Exam Up to date   Exercise (times per week) 4 times per week   Current Exercises Include Walking   Current Exercise Activities Include -   Do you need help using the phone?  No   Are you deaf or do you have serious difficulty hearing?  No   Do you need help with transportation? No   Do you need help shopping? No   Do you need help preparing meals?  No   Do you need help with housework?  No   Do you need help with laundry? No   Do you need help taking your medications? No   Do you need help managing money? No   Do you ever drive or ride in a car without wearing a seat belt? No   Have you felt unusual stress, anger or loneliness in the last month? No   Who do you live with? Child   If  you need help, do you have trouble finding someone available to you? No   Have you been bothered in the last four weeks by sexual problems? No   Do you have difficulty concentrating, remembering or making decisions? No       Age-appropriate Screening Schedule:  Refer to the list below for future screening recommendations based on patient's age, sex and/or medical conditions. Orders for these recommended tests are listed in the plan section. The patient has been provided with a written plan.    Health Maintenance   Topic Date Due   • ZOSTER VACCINE (2 of 3) 02/26/2008   • INFLUENZA VACCINE  08/01/2022   • LIPID PANEL  10/06/2022   • DXA SCAN  06/11/2023   • TDAP/TD VACCINES (2 - Td or Tdap) 04/01/2026              Assessment/Plan   CMS Preventative Services Quick Reference  Risk Factors Identified During Encounter  Immunizations Discussed/Encouraged (specific Immunizations; Shingrix  The above risks/problems have been discussed with the patient.  Follow up actions/plans if indicated are seen below in the Assessment/Plan Section.  Pertinent information has been shared with the patient in the After Visit Summary.    Diagnoses and all orders for this visit:    1. Encounter for subsequent annual wellness visit (AWV) in Medicare patient (Primary)    2. Essential hypertension  -     Comprehensive Metabolic Panel; Future    3. Other hyperlipidemia  -     Lipid Panel With / Chol / HDL Ratio; Future        Follow Up:   Return in about 6 months (around 10/14/2022) for Lab Before FUP.     An After Visit Summary and PPPS were made available to the patient.                 She will call Dr. Last to get an echo and stress test. Bmp is nl. BP is good. She is stable emotionally.

## 2022-04-30 DIAGNOSIS — I10 ESSENTIAL HYPERTENSION: ICD-10-CM

## 2022-05-02 RX ORDER — AMLODIPINE BESYLATE 10 MG/1
TABLET ORAL
Qty: 90 TABLET | Refills: 2 | Status: SHIPPED | OUTPATIENT
Start: 2022-05-02 | End: 2023-01-30

## 2022-06-01 DIAGNOSIS — I10 ESSENTIAL HYPERTENSION: ICD-10-CM

## 2022-06-02 RX ORDER — LOSARTAN POTASSIUM 100 MG/1
TABLET ORAL
Qty: 90 TABLET | Refills: 3 | Status: SHIPPED | OUTPATIENT
Start: 2022-06-02

## 2022-09-15 ENCOUNTER — TELEPHONE (OUTPATIENT)
Dept: INTERNAL MEDICINE | Facility: CLINIC | Age: 87
End: 2022-09-15

## 2022-09-15 NOTE — TELEPHONE ENCOUNTER
Caller: Shiela Chaudhary    Relationship to patient: Self    Best call back number: [phone]    Patient is needing: PATIENT STATES THAT SHE HAS SOME FLUID RETENTION IN LEFT LEG/FOOT AND WOULD LIKE TO SPEAK TO A NURSE. PLEASE CALL.

## 2022-09-16 ENCOUNTER — OFFICE VISIT (OUTPATIENT)
Dept: INTERNAL MEDICINE | Facility: CLINIC | Age: 87
End: 2022-09-16

## 2022-09-16 VITALS
DIASTOLIC BLOOD PRESSURE: 78 MMHG | BODY MASS INDEX: 31.28 KG/M2 | WEIGHT: 170 LBS | HEIGHT: 62 IN | TEMPERATURE: 97.5 F | SYSTOLIC BLOOD PRESSURE: 132 MMHG

## 2022-09-16 DIAGNOSIS — M79.89 LEFT LEG SWELLING: Primary | ICD-10-CM

## 2022-09-16 PROCEDURE — 90662 IIV NO PRSV INCREASED AG IM: CPT | Performed by: INTERNAL MEDICINE

## 2022-09-16 PROCEDURE — G0008 ADMIN INFLUENZA VIRUS VAC: HCPCS | Performed by: INTERNAL MEDICINE

## 2022-09-16 PROCEDURE — 99213 OFFICE O/P EST LOW 20 MIN: CPT | Performed by: INTERNAL MEDICINE

## 2022-09-16 NOTE — PROGRESS NOTES
Subjective        Chief Complaint   Patient presents with   • Edema           Shiela Chaudhary is a 88 y.o. female who presents for    Patient Active Problem List   Diagnosis   • Essential hypertension   • Other hyperlipidemia   • Depression   • Aortic insufficiency   • Osteopenia   • Thoracic spine pain   • Closed displaced fracture of right femoral neck (HCC)       History of Present Illness     She has had left leg swelling for 2 days. Her breathing is unchanged and she denies chest pain, melena, or hematochezia. She did not injure herself and she has no h/o blood clots. There is no pain.  Allergies   Allergen Reactions   • Lisinopril Cough   • Nsaids Nausea And Vomiting   • Fosamax [Alendronate] Other (See Comments)     Stomach ache       Current Outpatient Medications on File Prior to Visit   Medication Sig Dispense Refill   • acetaminophen (TYLENOL) 500 MG tablet Take 500 mg by mouth Every 6 (Six) Hours As Needed for Mild Pain .     • amLODIPine (NORVASC) 10 MG tablet TAKE ONE TABLET BY MOUTH DAILY 90 tablet 2   • aspirin 81 MG EC tablet Take 81 mg by mouth.     • Baclofen 5 MG tablet      • cholecalciferol (VITAMIN D3) 25 MCG (1000 UT) tablet Take 4 tablets by mouth Daily.     • citalopram (CeleXA) 20 MG tablet TAKE ONE TABLET BY MOUTH DAILY 90 tablet 3   • furosemide (LASIX) 20 MG tablet      • losartan (COZAAR) 100 MG tablet TAKE ONE TABLET BY MOUTH DAILY 90 tablet 3   • melatonin 5 MG tablet tablet Take  by mouth.     • Multiple Vitamins-Minerals (CENTRUM SILVER PO) Take 1 tablet by mouth Daily.     • omeprazole (priLOSEC) 40 MG capsule TAKE ONE CAPSULE BY MOUTH DAILY 90 capsule 2   • psyllium (METAMUCIL) 58.6 % packet Take 1 packet by mouth.     • simvastatin (ZOCOR) 20 MG tablet TAKE ONE TABLET BY MOUTH ONCE NIGHTLY 90 tablet 2   • [DISCONTINUED] oxybutynin (DITROPAN) 5 MG tablet        No current facility-administered medications on file prior to visit.       Past Medical History:   Diagnosis Date   •  "Aortic insufficiency 2015    moderate   • BCC (basal cell carcinoma of skin)    • Diverticulosis 2012   • History of positive PPD, untreated    • Hyperactivity of bladder    • LVH (left ventricular hypertrophy)    • Osteopenia    • Pulmonary HTN (HCC)    • Right hip pain    • Scoliosis    • SOB (shortness of breath)    • Tricuspid regurgitation        Past Surgical History:   Procedure Laterality Date   • BREAST SURGERY     • CATARACT EXTRACTION     • COLONOSCOPY  2013   • DILATATION AND CURETTAGE     • PARTIAL HIP ARTHROPLASTY Right 08/2021       Family History   Problem Relation Age of Onset   • Cancer Mother         vaginal   • Stroke Father    • Cancer Sister         liver   • Diabetes Brother    • Stroke Brother    • Hypertension Brother        Social History     Socioeconomic History   • Marital status:    Tobacco Use   • Smoking status: Never Smoker   • Smokeless tobacco: Never Used   Substance and Sexual Activity   • Alcohol use: Yes     Comment: rare   • Drug use: No   • Sexual activity: Defer           The following portions of the patient's history were reviewed and updated as appropriate: problem list, allergies, current medications, past medical history, past family history, past social history and past surgical history.    Review of Systems    Immunization History   Administered Date(s) Administered   • COVID-19 (PFIZER) PURPLE CAP 02/24/2021, 03/17/2021, 09/23/2021   • FLUAD TRI 65YR+ 09/24/2019   • Fluad Quad 65+ 09/17/2020   • Fluzone High Dose =>65 Years ([employer] ONLY) 09/29/2017, 09/30/2018   • Fluzone High-Dose 65+yrs 10/29/2021, 09/16/2022   • Hepatitis A 05/01/2018, 01/06/2019   • Pneumococcal Conjugate 13-Valent (PCV13) 03/31/2015   • Pneumococcal Polysaccharide (PPSV23) 01/01/2001   • Tdap 04/01/2016   • Zostavax 01/01/2008       Objective   Vitals:    09/16/22 1304   BP: 132/78   Temp: 97.5 °F (36.4 °C)   Weight: 77.1 kg (170 lb)   Height: 157.5 cm (62.01\")     Body mass index is " 31.08 kg/m².  Physical Exam  Vitals reviewed.   Constitutional:       Appearance: She is well-developed.   HENT:      Head: Normocephalic and atraumatic.   Cardiovascular:      Rate and Rhythm: Normal rate and regular rhythm.      Heart sounds: Normal heart sounds, S1 normal and S2 normal.      Comments: Left calf with 1+ edema. No cords. Varicose veins present. Negative Janiya's sign  Pulmonary:      Effort: Pulmonary effort is normal.      Breath sounds: Normal breath sounds.   Skin:     General: Skin is warm.   Neurological:      Mental Status: She is alert.   Psychiatric:         Behavior: Behavior normal.         Procedures    Assessment & Plan   Diagnoses and all orders for this visit:    1. Left leg swelling (Primary)  -     Duplex Venous Lower Extremity - Left CAR; Future    Other orders  -     Fluzone High-Dose 65+yrs ([id number])             Eval for DVT. If negative then elevate leg. Doubt CCB but possible contributor.    No follow-ups on file.

## 2022-09-19 ENCOUNTER — HOSPITAL ENCOUNTER (OUTPATIENT)
Dept: CARDIOLOGY | Facility: HOSPITAL | Age: 87
Discharge: HOME | End: 2022-09-19
Admitting: INTERNAL MEDICINE

## 2022-09-19 DIAGNOSIS — M79.89 LEFT LEG SWELLING: ICD-10-CM

## 2022-09-19 LAB
BH CV LOW VAS LEFT GREATER SAPH BK VESSEL: 1
BH CV LOWER VASCULAR LEFT COMMON FEMORAL AUGMENT: NORMAL
BH CV LOWER VASCULAR LEFT COMMON FEMORAL COMPETENT: NORMAL
BH CV LOWER VASCULAR LEFT COMMON FEMORAL COMPRESS: NORMAL
BH CV LOWER VASCULAR LEFT COMMON FEMORAL PHASIC: NORMAL
BH CV LOWER VASCULAR LEFT COMMON FEMORAL SPONT: NORMAL
BH CV LOWER VASCULAR LEFT DISTAL FEMORAL COMPRESS: NORMAL
BH CV LOWER VASCULAR LEFT GASTRONEMIUS COMPRESS: NORMAL
BH CV LOWER VASCULAR LEFT GREATER SAPH AK COMPRESS: NORMAL
BH CV LOWER VASCULAR LEFT GREATER SAPH BK COMPRESS: NORMAL
BH CV LOWER VASCULAR LEFT GREATER SAPH BK THROMBUS: NORMAL
BH CV LOWER VASCULAR LEFT LESSER SAPH COMPRESS: NORMAL
BH CV LOWER VASCULAR LEFT MID FEMORAL AUGMENT: NORMAL
BH CV LOWER VASCULAR LEFT MID FEMORAL COMPETENT: NORMAL
BH CV LOWER VASCULAR LEFT MID FEMORAL COMPRESS: NORMAL
BH CV LOWER VASCULAR LEFT MID FEMORAL PHASIC: NORMAL
BH CV LOWER VASCULAR LEFT MID FEMORAL SPONT: NORMAL
BH CV LOWER VASCULAR LEFT PERONEAL COMPRESS: NORMAL
BH CV LOWER VASCULAR LEFT POPLITEAL AUGMENT: NORMAL
BH CV LOWER VASCULAR LEFT POPLITEAL COMPETENT: NORMAL
BH CV LOWER VASCULAR LEFT POPLITEAL COMPRESS: NORMAL
BH CV LOWER VASCULAR LEFT POPLITEAL PHASIC: NORMAL
BH CV LOWER VASCULAR LEFT POPLITEAL SPONT: NORMAL
BH CV LOWER VASCULAR LEFT POSTERIOR TIBIAL COMPRESS: NORMAL
BH CV LOWER VASCULAR LEFT PROFUNDA FEMORAL COMPRESS: NORMAL
BH CV LOWER VASCULAR LEFT PROXIMAL FEMORAL COMPRESS: NORMAL
BH CV LOWER VASCULAR LEFT SAPHENOFEMORAL JUNCTION COMPRESS: NORMAL
BH CV LOWER VASCULAR RIGHT COMMON FEMORAL AUGMENT: NORMAL
BH CV LOWER VASCULAR RIGHT COMMON FEMORAL COMPETENT: NORMAL
BH CV LOWER VASCULAR RIGHT COMMON FEMORAL COMPRESS: NORMAL
BH CV LOWER VASCULAR RIGHT COMMON FEMORAL PHASIC: NORMAL
BH CV LOWER VASCULAR RIGHT COMMON FEMORAL SPONT: NORMAL
MAXIMAL PREDICTED HEART RATE: 132 BPM
STRESS TARGET HR: 112 BPM

## 2022-09-19 PROCEDURE — 93971 EXTREMITY STUDY: CPT

## 2022-10-15 DIAGNOSIS — E78.5 HYPERLIPIDEMIA, UNSPECIFIED HYPERLIPIDEMIA TYPE: ICD-10-CM

## 2022-10-17 RX ORDER — SIMVASTATIN 20 MG
TABLET ORAL
Qty: 90 TABLET | Refills: 2 | Status: SHIPPED | OUTPATIENT
Start: 2022-10-17

## 2022-10-27 ENCOUNTER — HOSPITAL ENCOUNTER (OUTPATIENT)
Dept: GENERAL RADIOLOGY | Facility: HOSPITAL | Age: 87
Discharge: HOME | End: 2022-10-27
Admitting: INTERNAL MEDICINE

## 2022-10-27 ENCOUNTER — OFFICE VISIT (OUTPATIENT)
Dept: INTERNAL MEDICINE | Facility: CLINIC | Age: 87
End: 2022-10-27

## 2022-10-27 VITALS
WEIGHT: 170 LBS | TEMPERATURE: 97.5 F | SYSTOLIC BLOOD PRESSURE: 124 MMHG | BODY MASS INDEX: 31.28 KG/M2 | OXYGEN SATURATION: 94 % | HEIGHT: 62 IN | DIASTOLIC BLOOD PRESSURE: 80 MMHG

## 2022-10-27 DIAGNOSIS — I10 ESSENTIAL HYPERTENSION: Primary | Chronic | ICD-10-CM

## 2022-10-27 DIAGNOSIS — R06.09 DOE (DYSPNEA ON EXERTION): ICD-10-CM

## 2022-10-27 DIAGNOSIS — E78.49 OTHER HYPERLIPIDEMIA: Chronic | ICD-10-CM

## 2022-10-27 DIAGNOSIS — R00.1 BRADYCARDIA: ICD-10-CM

## 2022-10-27 PROBLEM — S72.001A CLOSED DISPLACED FRACTURE OF RIGHT FEMORAL NECK (HCC): Status: RESOLVED | Noted: 2021-08-18 | Resolved: 2022-10-27

## 2022-10-27 LAB
BASOPHILS # BLD AUTO: 0.05 10*3/MM3 (ref 0–0.2)
BASOPHILS NFR BLD AUTO: 0.7 % (ref 0–1.5)
EOSINOPHIL # BLD AUTO: 0.23 10*3/MM3 (ref 0–0.4)
EOSINOPHIL NFR BLD AUTO: 3.3 % (ref 0.3–6.2)
ERYTHROCYTE [DISTWIDTH] IN BLOOD BY AUTOMATED COUNT: 12.5 % (ref 12.3–15.4)
HCT VFR BLD AUTO: 40.8 % (ref 34–46.6)
HGB BLD-MCNC: 13.7 G/DL (ref 12–15.9)
IMM GRANULOCYTES # BLD AUTO: 0.03 10*3/MM3 (ref 0–0.05)
IMM GRANULOCYTES NFR BLD AUTO: 0.4 % (ref 0–0.5)
LYMPHOCYTES # BLD AUTO: 1.86 10*3/MM3 (ref 0.7–3.1)
LYMPHOCYTES NFR BLD AUTO: 26.6 % (ref 19.6–45.3)
MCH RBC QN AUTO: 29.8 PG (ref 26.6–33)
MCHC RBC AUTO-ENTMCNC: 33.6 G/DL (ref 31.5–35.7)
MCV RBC AUTO: 88.7 FL (ref 79–97)
MONOCYTES # BLD AUTO: 0.52 10*3/MM3 (ref 0.1–0.9)
MONOCYTES NFR BLD AUTO: 7.4 % (ref 5–12)
NEUTROPHILS # BLD AUTO: 4.29 10*3/MM3 (ref 1.7–7)
NEUTROPHILS NFR BLD AUTO: 61.6 % (ref 42.7–76)
NRBC BLD AUTO-RTO: 0 /100 WBC (ref 0–0.2)
PLATELET # BLD AUTO: 277 10*3/MM3 (ref 140–450)
RBC # BLD AUTO: 4.6 10*6/MM3 (ref 3.77–5.28)
TSH SERPL DL<=0.005 MIU/L-ACNC: 2.02 UIU/ML (ref 0.27–4.2)
WBC # BLD AUTO: 6.98 10*3/MM3 (ref 3.4–10.8)

## 2022-10-27 PROCEDURE — 93000 ELECTROCARDIOGRAM COMPLETE: CPT | Performed by: INTERNAL MEDICINE

## 2022-10-27 PROCEDURE — 99214 OFFICE O/P EST MOD 30 MIN: CPT | Performed by: INTERNAL MEDICINE

## 2022-10-27 PROCEDURE — 71046 X-RAY EXAM CHEST 2 VIEWS: CPT

## 2022-10-27 NOTE — PROGRESS NOTES
Subjective        Chief Complaint   Patient presents with   • Hyperlipidemia   • Hypertension           Shiela Chaudhary is a 88 y.o. female who presents for    Patient Active Problem List   Diagnosis   • Essential hypertension   • Other hyperlipidemia   • Depression   • Aortic insufficiency   • Osteopenia       History of Present Illness     She has more dyspnea with activity. She saw Dr. Montgomery in August; her meds were not changed. She denies chest pain, wheeze or cough. Her swelling is better with compression hose. She has not been checking her BP.  Allergies   Allergen Reactions   • Fosamax [Alendronate] Other (See Comments)     Stomach ache   • Lisinopril Cough   • Nsaids Nausea And Vomiting       Current Outpatient Medications on File Prior to Visit   Medication Sig Dispense Refill   • acetaminophen (TYLENOL) 500 MG tablet Take 500 mg by mouth Every 6 (Six) Hours As Needed for Mild Pain .     • amLODIPine (NORVASC) 10 MG tablet TAKE ONE TABLET BY MOUTH DAILY 90 tablet 2   • aspirin 81 MG EC tablet Take 81 mg by mouth.     • cholecalciferol (VITAMIN D3) 25 MCG (1000 UT) tablet Take 4 tablets by mouth Daily.     • citalopram (CeleXA) 20 MG tablet TAKE ONE TABLET BY MOUTH DAILY 90 tablet 3   • furosemide (LASIX) 20 MG tablet Take 1 tablet by mouth Daily.     • losartan (COZAAR) 100 MG tablet TAKE ONE TABLET BY MOUTH DAILY 90 tablet 3   • melatonin 5 MG tablet tablet Take  by mouth.     • Multiple Vitamins-Minerals (CENTRUM SILVER PO) Take 1 tablet by mouth Daily.     • omeprazole (priLOSEC) 40 MG capsule TAKE ONE CAPSULE BY MOUTH DAILY 90 capsule 2   • psyllium (METAMUCIL) 58.6 % packet Take 1 packet by mouth.     • simvastatin (ZOCOR) 20 MG tablet TAKE ONE TABLET BY MOUTH ONCE NIGHTLY 90 tablet 2   • [DISCONTINUED] Baclofen 5 MG tablet        No current facility-administered medications on file prior to visit.       Past Medical History:   Diagnosis Date   • Aortic insufficiency 2015    moderate   • BCC (basal  cell carcinoma of skin)    • Closed displaced fracture of right femoral neck (HCC) 8/18/2021    Formatting of this note might be different from the original. Added automatically from request for surgery [id number]   • Diverticulosis 2012   • History of positive PPD, untreated    • Hyperactivity of bladder    • LVH (left ventricular hypertrophy)    • Osteopenia    • Pulmonary HTN (HCC)    • Right hip pain    • Scoliosis    • SOB (shortness of breath)    • Tricuspid regurgitation        Past Surgical History:   Procedure Laterality Date   • BREAST SURGERY     • CATARACT EXTRACTION     • COLONOSCOPY  2013   • DILATATION AND CURETTAGE     • PARTIAL HIP ARTHROPLASTY Right 08/2021       Family History   Problem Relation Age of Onset   • Cancer Mother         vaginal   • Stroke Father    • Cancer Sister         liver   • Diabetes Brother    • Stroke Brother    • Hypertension Brother        Social History     Socioeconomic History   • Marital status:    Tobacco Use   • Smoking status: Never   • Smokeless tobacco: Never   Substance and Sexual Activity   • Alcohol use: Yes     Comment: rare   • Drug use: No   • Sexual activity: Defer           The following portions of the patient's history were reviewed and updated as appropriate: problem list, allergies, current medications, past medical history, past family history, past social history and past surgical history.    Review of Systems    Immunization History   Administered Date(s) Administered   • COVID-19 (PFIZER) PURPLE CAP 02/24/2021, 03/17/2021, 09/23/2021   • FLUAD TRI 65YR+ 09/24/2019   • Fluad Quad 65+ 09/17/2020   • Fluzone High Dose =>65 Years ([employer] ONLY) 09/29/2017, 09/30/2018   • Fluzone High-Dose 65+yrs 10/29/2021, 09/16/2022   • Hepatitis A 05/01/2018, 01/06/2019   • Pneumococcal Conjugate 13-Valent (PCV13) 03/31/2015   • Pneumococcal Polysaccharide (PPSV23) 01/01/2001   • Tdap 04/01/2016   • Zostavax 01/01/2008       Objective   Vitals:    10/27/22 0909  "  BP: 124/80   Temp: 97.5 °F (36.4 °C)   TempSrc: Infrared   SpO2: 94%   Weight: 77.1 kg (170 lb)   Height: 157.5 cm (62.01\")     Body mass index is 31.09 kg/m².  Physical Exam  Vitals reviewed.   Constitutional:       Appearance: She is well-developed.   HENT:      Head: Normocephalic and atraumatic.   Cardiovascular:      Rate and Rhythm: Regular rhythm. Bradycardia present.      Heart sounds: S1 normal and S2 normal. Murmur heard.    Systolic murmur is present with a grade of 1/6.     Comments: No edema on exam  Pulmonary:      Effort: Pulmonary effort is normal.      Breath sounds: Normal breath sounds.   Skin:     General: Skin is warm.   Neurological:      Mental Status: She is alert.   Psychiatric:         Behavior: Behavior normal.           ECG 12 Lead    Date/Time: 10/27/2022 9:33 AM  Performed by: Micheal Almaguer MD  Authorized by: Micheal Almaguer MD   Comparison: not compared with previous ECG   Rhythm: sinus bradycardia  Rate: bradycardic  QRS axis: normal    Clinical impression: abnormal EKG  Comments: Sinus bradycardia            Assessment & Plan   Diagnoses and all orders for this visit:    1. Essential hypertension (Primary)    2. Other hyperlipidemia    3. DIEGO (dyspnea on exertion)  -     XR Chest PA & Lateral  -     CBC & Differential  -     ECG 12 Lead    4. Bradycardia  -     TSH               Reviewed cmp and FLP. BP and LDL are good. Given worsening dyspnea, I will get a CXR and CBC. I have encouraged her to call Dr. Montgomery to discuss stress test and if need for a holter.  Return in about 6 months (around 4/27/2023) for Medicare Wellness, Lab Today.  "

## 2022-11-15 DIAGNOSIS — K21.9 GASTROESOPHAGEAL REFLUX DISEASE: ICD-10-CM

## 2022-11-16 RX ORDER — OMEPRAZOLE 40 MG/1
CAPSULE, DELAYED RELEASE ORAL
Qty: 90 CAPSULE | Refills: 2 | Status: SHIPPED | OUTPATIENT
Start: 2022-11-16

## 2023-01-08 DIAGNOSIS — F32.A DEPRESSION, UNSPECIFIED DEPRESSION TYPE: ICD-10-CM

## 2023-01-09 RX ORDER — CITALOPRAM 20 MG/1
TABLET ORAL
Qty: 90 TABLET | Refills: 3 | Status: SHIPPED | OUTPATIENT
Start: 2023-01-09

## 2023-01-30 DIAGNOSIS — I10 ESSENTIAL HYPERTENSION: ICD-10-CM

## 2023-01-30 RX ORDER — AMLODIPINE BESYLATE 10 MG/1
TABLET ORAL
Qty: 90 TABLET | Refills: 2 | Status: SHIPPED | OUTPATIENT
Start: 2023-01-30

## 2023-05-01 ENCOUNTER — OFFICE VISIT (OUTPATIENT)
Dept: INTERNAL MEDICINE | Facility: CLINIC | Age: 88
End: 2023-05-01
Payer: MEDICARE

## 2023-05-01 VITALS
DIASTOLIC BLOOD PRESSURE: 84 MMHG | BODY MASS INDEX: 32.24 KG/M2 | HEIGHT: 62 IN | TEMPERATURE: 97.5 F | WEIGHT: 175.2 LBS | SYSTOLIC BLOOD PRESSURE: 126 MMHG

## 2023-05-01 DIAGNOSIS — E78.49 OTHER HYPERLIPIDEMIA: Chronic | ICD-10-CM

## 2023-05-01 DIAGNOSIS — Z00.00 ENCOUNTER FOR SUBSEQUENT ANNUAL WELLNESS VISIT (AWV) IN MEDICARE PATIENT: Primary | ICD-10-CM

## 2023-05-01 DIAGNOSIS — I10 ESSENTIAL HYPERTENSION: Chronic | ICD-10-CM

## 2023-05-01 PROCEDURE — 1170F FXNL STATUS ASSESSED: CPT | Performed by: INTERNAL MEDICINE

## 2023-05-01 PROCEDURE — G0439 PPPS, SUBSEQ VISIT: HCPCS | Performed by: INTERNAL MEDICINE

## 2023-05-01 PROCEDURE — 1160F RVW MEDS BY RX/DR IN RCRD: CPT | Performed by: INTERNAL MEDICINE

## 2023-05-01 PROCEDURE — 1159F MED LIST DOCD IN RCRD: CPT | Performed by: INTERNAL MEDICINE

## 2023-05-01 RX ORDER — ISOSORBIDE MONONITRATE 30 MG/1
30 TABLET, EXTENDED RELEASE ORAL DAILY
COMMUNITY

## 2023-05-01 NOTE — PROGRESS NOTES
The ABCs of the Annual Wellness Visit  Subsequent Medicare Wellness Visit    Subjective    Shiela Chaudhary is a 88 y.o. female who presents for a Subsequent Medicare Wellness Visit.  She denies chest pain. She has dyspnea which is unchanged. She has not been checking her BP. She is doing well emotionally.  The following portions of the patient's history were reviewed and   updated as appropriate: allergies, current medications, past family history, past medical history, past social history, past surgical history and problem list.    Compared to one year ago, the patient feels her physical   health is the same.    Compared to one year ago, the patient feels her mental   health is the same.    Recent Hospitalizations:  She was not admitted to the hospital during the last year.       Current Medical Providers:  Patient Care Team:  Micheal Almaguer MD as PCP - General (Internal Medicine)    Outpatient Medications Prior to Visit   Medication Sig Dispense Refill   • acetaminophen (TYLENOL) 500 MG tablet Take 1 tablet by mouth Every 6 (Six) Hours As Needed for Mild Pain.     • amLODIPine (NORVASC) 10 MG tablet TAKE ONE TABLET BY MOUTH DAILY 90 tablet 2   • aspirin 81 MG EC tablet Take 1 tablet by mouth.     • cholecalciferol (VITAMIN D3) 25 MCG (1000 UT) tablet Take 4 tablets by mouth Daily.     • citalopram (CeleXA) 20 MG tablet TAKE ONE TABLET BY MOUTH DAILY 90 tablet 3   • furosemide (LASIX) 20 MG tablet Take 1 tablet by mouth Daily.     • isosorbide mononitrate (IMDUR) 30 MG 24 hr tablet Take 1 tablet by mouth Daily.     • losartan (COZAAR) 100 MG tablet TAKE ONE TABLET BY MOUTH DAILY 90 tablet 3   • melatonin 5 MG tablet tablet Take  by mouth.     • Multiple Vitamins-Minerals (CENTRUM SILVER PO) Take 1 tablet by mouth Daily.     • omeprazole (priLOSEC) 40 MG capsule TAKE ONE CAPSULE BY MOUTH DAILY 90 capsule 2   • psyllium (METAMUCIL) 58.6 % packet Take 1 packet by mouth.     • simvastatin (ZOCOR) 20 MG tablet TAKE  "ONE TABLET BY MOUTH ONCE NIGHTLY 90 tablet 2     No facility-administered medications prior to visit.       No opioid medication identified on active medication list. I have reviewed chart for other potential  high risk medication/s and harmful drug interactions in the elderly.          Aspirin is on active medication list. Aspirin use is indicated based on review of current medical condition/s. Pros and cons of this therapy have been discussed today. Benefits of this medication outweigh potential harm.  Patient has been encouraged to continue taking this medication.  .      Patient Active Problem List   Diagnosis   • Essential hypertension   • Other hyperlipidemia   • Depression   • Aortic insufficiency   • Osteopenia     Advance Care Planning   Advance Care Planning     Advance Directive is not on file.  ACP discussion was held with the patient during this visit. Patient has an advance directive (not in EMR), copy requested.     Objective    Vitals:    23 1058   BP: 126/84   Temp: 97.5 °F (36.4 °C)   Weight: 79.5 kg (175 lb 3.2 oz)   Height: 157.5 cm (62.01\")     Estimated body mass index is 32.04 kg/m² as calculated from the following:    Height as of this encounter: 157.5 cm (62.01\").    Weight as of this encounter: 79.5 kg (175 lb 3.2 oz).    BMI is >= 30 and <35. (Class 1 Obesity). The following options were offered after discussion;: exercise counseling/recommendations      Does the patient have evidence of cognitive impairment? No          HEALTH RISK ASSESSMENT    Smoking Status:  Social History     Tobacco Use   Smoking Status Never   Smokeless Tobacco Never     Alcohol Consumption:  Social History     Substance and Sexual Activity   Alcohol Use Yes    Comment: rare     Fall Risk Screen:    STEADI Fall Risk Assessment was completed, and patient is at LOW risk for falls.Assessment completed on:2023    Depression Screenin/1/2023    11:05 AM   PHQ-2/PHQ-9 Depression Screening   Little " Interest or Pleasure in Doing Things 0-->not at all   Feeling Down, Depressed or Hopeless 0-->not at all   PHQ-9: Brief Depression Severity Measure Score 0       Health Habits and Functional and Cognitive Screenin/1/2023    11:02 AM   Functional & Cognitive Status   Do you have difficulty preparing food and eating? No   Do you have difficulty bathing yourself, getting dressed or grooming yourself? No   Do you have difficulty using the toilet? No   Do you have difficulty moving around from place to place? No   Do you have trouble with steps or getting out of a bed or a chair? No   Current Diet Limited Junk Food   Dental Exam Up to date   Eye Exam Up to date   Exercise (times per week) 6 times per week   Current Exercises Include Walking;Cardiovascular Workout   Do you need help using the phone?  No   Are you deaf or do you have serious difficulty hearing?  No   Do you need help with transportation? No   Do you need help shopping? No   Do you need help preparing meals?  No   Do you need help with housework?  No   Do you need help with laundry? No   Do you need help taking your medications? No   Do you need help managing money? No   Do you ever drive or ride in a car without wearing a seat belt? No       Age-appropriate Screening Schedule:  Refer to the list below for future screening recommendations based on patient's age, sex and/or medical conditions. Orders for these recommended tests are listed in the plan section. The patient has been provided with a written plan.    Health Maintenance   Topic Date Due   • ZOSTER VACCINE (2 of 3) 2008   • DXA SCAN  2023   • INFLUENZA VACCINE  2023   • LIPID PANEL  10/12/2023   • ANNUAL WELLNESS VISIT  2024   • TDAP/TD VACCINES (2 - Td or Tdap) 2026   • COVID-19 Vaccine  Completed   • Pneumococcal Vaccine 65+  Completed                  CMS Preventative Services Quick Reference  Risk Factors Identified During Encounter  Immunizations  "Discussed/Encouraged: Shingrix  The above risks/problems have been discussed with the patient.  Pertinent information has been shared with the patient in the After Visit Summary.  An After Visit Summary and PPPS were made available to the patient.    Follow Up:   Next Medicare Wellness visit to be scheduled in 1 year.       Additional E&M Note during same encounter follows:  Patient has multiple medical problems which are significant and separately identifiable that require additional work above and beyond the Medicare Wellness Visit.      Chief Complaint  Medicare Wellness-subsequent    Subjective        HPI  Shiela Chaudhary is also being seen today for Medicare Wellness and HTN.  She denies chest pain. She has dyspnea which is unchanged. She has not been checking her BP. She is doing well emotionally.         Objective   Vital Signs:  /84   Temp 97.5 °F (36.4 °C)   Ht 157.5 cm (62.01\")   Wt 79.5 kg (175 lb 3.2 oz)   BMI 32.04 kg/m²     Physical Exam  Vitals reviewed.   Constitutional:       Appearance: She is well-developed.   HENT:      Head: Normocephalic and atraumatic.   Cardiovascular:      Rate and Rhythm: Normal rate and regular rhythm.      Heart sounds: S1 normal and S2 normal. Murmur heard.    Systolic murmur is present with a grade of 1/6.  Pulmonary:      Effort: Pulmonary effort is normal.      Breath sounds: Normal breath sounds.   Skin:     General: Skin is warm.   Neurological:      Mental Status: She is alert.   Psychiatric:         Behavior: Behavior normal.                         Assessment and Plan   Diagnoses and all orders for this visit:    1. Encounter for subsequent annual wellness visit (AWV) in Medicare patient (Primary)    2. Essential hypertension    3. Other hyperlipidemia  -     Comprehensive Metabolic Panel; Future  -     Lipid Panel With / Chol / HDL Ratio; Future             Follow Up   Return in about 6 months (around 11/1/2023) for Lab Before FUP.  Patient was given " instructions and counseling regarding her condition or for health maintenance advice. Please see specific information pulled into the AVS if appropriate.     BP is good. I think she is going great at age 88.

## 2023-05-27 DIAGNOSIS — I10 ESSENTIAL HYPERTENSION: ICD-10-CM

## 2023-05-30 RX ORDER — LOSARTAN POTASSIUM 100 MG/1
TABLET ORAL
Qty: 90 TABLET | Refills: 3 | Status: SHIPPED | OUTPATIENT
Start: 2023-05-30

## 2023-08-14 DIAGNOSIS — K21.9 GASTROESOPHAGEAL REFLUX DISEASE: ICD-10-CM

## 2023-08-14 RX ORDER — OMEPRAZOLE 40 MG/1
CAPSULE, DELAYED RELEASE ORAL
Qty: 90 CAPSULE | Refills: 2 | Status: SHIPPED | OUTPATIENT
Start: 2023-08-14

## 2023-09-22 ENCOUNTER — TELEPHONE (OUTPATIENT)
Dept: INTERNAL MEDICINE | Facility: CLINIC | Age: 88
End: 2023-09-22

## 2023-11-02 ENCOUNTER — OFFICE VISIT (OUTPATIENT)
Dept: INTERNAL MEDICINE | Facility: CLINIC | Age: 88
End: 2023-11-02
Payer: MEDICARE

## 2023-11-02 VITALS
HEIGHT: 62 IN | SYSTOLIC BLOOD PRESSURE: 134 MMHG | BODY MASS INDEX: 32.13 KG/M2 | DIASTOLIC BLOOD PRESSURE: 80 MMHG | WEIGHT: 174.6 LBS

## 2023-11-02 DIAGNOSIS — I10 ESSENTIAL HYPERTENSION: Primary | Chronic | ICD-10-CM

## 2023-11-02 DIAGNOSIS — I10 ESSENTIAL HYPERTENSION: ICD-10-CM

## 2023-11-02 DIAGNOSIS — R73.9 HYPERGLYCEMIA: ICD-10-CM

## 2023-11-02 DIAGNOSIS — E78.49 OTHER HYPERLIPIDEMIA: Chronic | ICD-10-CM

## 2023-11-02 LAB — HBA1C MFR BLD: 6.1 % (ref 4.8–5.6)

## 2023-11-02 PROCEDURE — 1159F MED LIST DOCD IN RCRD: CPT | Performed by: INTERNAL MEDICINE

## 2023-11-02 PROCEDURE — 1160F RVW MEDS BY RX/DR IN RCRD: CPT | Performed by: INTERNAL MEDICINE

## 2023-11-02 PROCEDURE — 99213 OFFICE O/P EST LOW 20 MIN: CPT | Performed by: INTERNAL MEDICINE

## 2023-11-02 RX ORDER — AMLODIPINE BESYLATE 10 MG/1
TABLET ORAL
Qty: 90 TABLET | Refills: 2 | Status: SHIPPED | OUTPATIENT
Start: 2023-11-02

## 2023-11-02 NOTE — PROGRESS NOTES
Subjective        Chief Complaint   Patient presents with    Hypertension    Hyperlipidemia           Shiela Chaudhary is a 89 y.o. female who presents for    Patient Active Problem List   Diagnosis    Essential hypertension    Other hyperlipidemia    Depression    Aortic insufficiency    Osteopenia       History of Present Illness       She has not been checking her BP. She is doing well emotionally. She denies chest pain. Her dyspnea is stable; she does have with activity. She saw cards in August and he felt she was stable.  Allergies   Allergen Reactions    Fosamax [Alendronate] Other (See Comments)     Stomach ache    Lisinopril Cough    Nsaids Nausea And Vomiting       Current Outpatient Medications on File Prior to Visit   Medication Sig Dispense Refill    acetaminophen (TYLENOL) 500 MG tablet Take 1 tablet by mouth Every 6 (Six) Hours As Needed for Mild Pain.      aspirin 81 MG EC tablet Take 1 tablet by mouth.      cholecalciferol (VITAMIN D3) 25 MCG (1000 UT) tablet Take 4 tablets by mouth Daily.      citalopram (CeleXA) 20 MG tablet TAKE ONE TABLET BY MOUTH DAILY 90 tablet 3    furosemide (LASIX) 20 MG tablet Take 1 tablet by mouth Daily.      isosorbide mononitrate (IMDUR) 30 MG 24 hr tablet Take 1 tablet by mouth Daily.      losartan (COZAAR) 100 MG tablet TAKE ONE TABLET BY MOUTH DAILY 90 tablet 3    melatonin 5 MG tablet tablet Take  by mouth.      Multiple Vitamins-Minerals (CENTRUM SILVER PO) Take 1 tablet by mouth Daily.      omeprazole (priLOSEC) 40 MG capsule TAKE ONE CAPSULE BY MOUTH DAILY 90 capsule 2    psyllium (METAMUCIL) 58.6 % packet Take 1 packet by mouth.      simvastatin (ZOCOR) 20 MG tablet TAKE ONE TABLET BY MOUTH ONCE NIGHTLY 90 tablet 2    [DISCONTINUED] amLODIPine (NORVASC) 10 MG tablet TAKE ONE TABLET BY MOUTH DAILY 90 tablet 2     No current facility-administered medications on file prior to visit.       Past Medical History:   Diagnosis Date    Aortic insufficiency 2015    moderate     BCC (basal cell carcinoma of skin)     Closed displaced fracture of right femoral neck 8/18/2021    Formatting of this note might be different from the original. Added automatically from request for surgery [id number]    Diverticulosis 2012    History of positive PPD, untreated     Hyperactivity of bladder     LVH (left ventricular hypertrophy)     Osteopenia     Pulmonary HTN     Right hip pain     Scoliosis     SOB (shortness of breath)     Tricuspid regurgitation        Past Surgical History:   Procedure Laterality Date    BREAST SURGERY      CATARACT EXTRACTION      COLONOSCOPY  2013    DILATATION AND CURETTAGE      PARTIAL HIP ARTHROPLASTY Right 08/2021       Family History   Problem Relation Age of Onset    Cancer Mother         vaginal    Stroke Father     Cancer Sister         liver    Diabetes Brother     Stroke Brother     Hypertension Brother        Social History     Socioeconomic History    Marital status:    Tobacco Use    Smoking status: Never    Smokeless tobacco: Never   Substance and Sexual Activity    Alcohol use: Yes     Comment: rare    Drug use: No    Sexual activity: Defer           The following portions of the patient's history were reviewed and updated as appropriate: problem list, allergies, current medications, past medical history, past family history, past social history, and past surgical history.    Review of Systems    Immunization History   Administered Date(s) Administered    ABRYSVO 09/28/2023    COVID-19 (PFIZER) BIVALENT 12+YRS 10/28/2022    COVID-19 (PFIZER) Purple Cap Monovalent 02/24/2021, 03/17/2021, 09/23/2021    COVID-19 F23 (PFIZER) 12YRS+ (COMIRNATY) 09/28/2023    FLUAD TRI 65YR+ 09/24/2019    Fluad Quad 65+ 09/17/2020    Fluzone High Dose =>65 Years ([employer] ONLY) 09/29/2017, 09/30/2018    Fluzone High-Dose 65+yrs 10/29/2021, 09/16/2022, 09/23/2023    Hepatitis A 05/01/2018, 01/06/2019    Pneumococcal Conjugate 13-Valent (PCV13) 03/31/2015    Pneumococcal  "Polysaccharide (PPSV23) 01/01/2001    Tdap 04/01/2016    Zostavax 01/01/2008       Objective   Vitals:    11/02/23 1000   BP: 134/80   Weight: 79.2 kg (174 lb 9.6 oz)   Height: 157.5 cm (62.01\")     Body mass index is 31.93 kg/m².  Physical Exam  Vitals reviewed.   Constitutional:       Appearance: She is well-developed.   HENT:      Head: Normocephalic and atraumatic.   Cardiovascular:      Rate and Rhythm: Normal rate and regular rhythm.      Heart sounds: Normal heart sounds, S1 normal and S2 normal.       with a grade of 1/6.   Pulmonary:      Effort: Pulmonary effort is normal.      Breath sounds: Normal breath sounds.   Skin:     General: Skin is warm.   Neurological:      Mental Status: She is alert.   Psychiatric:         Behavior: Behavior normal.         Procedures    Assessment & Plan   Diagnoses and all orders for this visit:    1. Essential hypertension (Primary)  Comments:  BP acceptable at age 89.  Orders:  -     Basic Metabolic Panel; Future    2. Other hyperlipidemia  Comments:  LDL at goal    3. Hyperglycemia  -     Hemoglobin A1c               Reviewed cmp and flp. Check A1c with sugar being 140.    Return in about 6 months (around 5/2/2024) for Lab Today, Medicare Wellness, Lab Before FUP.  "

## 2023-11-03 DIAGNOSIS — R73.9 HYPERGLYCEMIA: Primary | ICD-10-CM

## 2023-12-31 DIAGNOSIS — F32.A DEPRESSION, UNSPECIFIED DEPRESSION TYPE: ICD-10-CM

## 2024-01-02 RX ORDER — CITALOPRAM 20 MG/1
TABLET ORAL
Qty: 90 TABLET | Refills: 3 | Status: SHIPPED | OUTPATIENT
Start: 2024-01-02

## 2024-04-04 DIAGNOSIS — E78.5 HYPERLIPIDEMIA, UNSPECIFIED HYPERLIPIDEMIA TYPE: ICD-10-CM

## 2024-04-04 RX ORDER — SIMVASTATIN 20 MG
TABLET ORAL
Qty: 90 TABLET | Refills: 2 | Status: SHIPPED | OUTPATIENT
Start: 2024-04-04

## 2024-05-06 DIAGNOSIS — K21.9 GASTROESOPHAGEAL REFLUX DISEASE: ICD-10-CM

## 2024-05-06 RX ORDER — OMEPRAZOLE 40 MG/1
40 CAPSULE, DELAYED RELEASE ORAL DAILY
Qty: 90 CAPSULE | Refills: 2 | Status: SHIPPED | OUTPATIENT
Start: 2024-05-06

## 2024-05-16 ENCOUNTER — HOSPITAL ENCOUNTER (OUTPATIENT)
Facility: HOSPITAL | Age: 89
Discharge: HOME | End: 2024-05-16
Payer: MEDICARE

## 2024-05-16 ENCOUNTER — OFFICE VISIT (OUTPATIENT)
Dept: INTERNAL MEDICINE | Facility: CLINIC | Age: 89
End: 2024-05-16
Payer: MEDICARE

## 2024-05-16 ENCOUNTER — LAB (OUTPATIENT)
Facility: HOSPITAL | Age: 89
End: 2024-05-16
Payer: MEDICARE

## 2024-05-16 VITALS
BODY MASS INDEX: 31.8 KG/M2 | WEIGHT: 172.8 LBS | HEIGHT: 62 IN | DIASTOLIC BLOOD PRESSURE: 80 MMHG | SYSTOLIC BLOOD PRESSURE: 116 MMHG

## 2024-05-16 DIAGNOSIS — I35.1 NONRHEUMATIC AORTIC VALVE INSUFFICIENCY: ICD-10-CM

## 2024-05-16 DIAGNOSIS — R73.03 PRE-DIABETES: ICD-10-CM

## 2024-05-16 DIAGNOSIS — I10 ESSENTIAL HYPERTENSION: Chronic | ICD-10-CM

## 2024-05-16 DIAGNOSIS — E78.49 OTHER HYPERLIPIDEMIA: Chronic | ICD-10-CM

## 2024-05-16 DIAGNOSIS — Z00.00 ENCOUNTER FOR SUBSEQUENT ANNUAL WELLNESS VISIT (AWV) IN MEDICARE PATIENT: Primary | ICD-10-CM

## 2024-05-16 DIAGNOSIS — R06.02 SHORTNESS OF BREATH: ICD-10-CM

## 2024-05-16 LAB
ANION GAP SERPL CALCULATED.3IONS-SCNC: 9.6 MMOL/L (ref 5–15)
BASOPHILS # BLD AUTO: 0.05 10*3/MM3 (ref 0–0.2)
BASOPHILS NFR BLD AUTO: 0.7 % (ref 0–1.5)
BUN SERPL-MCNC: 16 MG/DL (ref 8–23)
BUN/CREAT SERPL: 24.6 (ref 7–25)
CALCIUM SPEC-SCNC: 9.5 MG/DL (ref 8.6–10.5)
CHLORIDE SERPL-SCNC: 102 MMOL/L (ref 98–107)
CHOLEST SERPL-MCNC: 155 MG/DL (ref 0–200)
CO2 SERPL-SCNC: 26.4 MMOL/L (ref 22–29)
CREAT SERPL-MCNC: 0.65 MG/DL (ref 0.57–1)
DEPRECATED RDW RBC AUTO: 40.6 FL (ref 37–54)
EGFRCR SERPLBLD CKD-EPI 2021: 84.3 ML/MIN/1.73
EOSINOPHIL # BLD AUTO: 0.16 10*3/MM3 (ref 0–0.4)
EOSINOPHIL NFR BLD AUTO: 2.2 % (ref 0.3–6.2)
ERYTHROCYTE [DISTWIDTH] IN BLOOD BY AUTOMATED COUNT: 13.2 % (ref 12.3–15.4)
GLUCOSE SERPL-MCNC: 95 MG/DL (ref 65–99)
HBA1C MFR BLD: 6 % (ref 4.8–5.6)
HCT VFR BLD AUTO: 38.6 % (ref 34–46.6)
HDLC SERPL QL: 3.3
HDLC SERPL-MCNC: 47 MG/DL (ref 40–60)
HGB BLD-MCNC: 12.8 G/DL (ref 12–15.9)
IMM GRANULOCYTES # BLD AUTO: 0.04 10*3/MM3 (ref 0–0.05)
IMM GRANULOCYTES NFR BLD AUTO: 0.5 % (ref 0–0.5)
LDLC SERPL CALC-MCNC: 76 MG/DL (ref 0–100)
LYMPHOCYTES # BLD AUTO: 1.77 10*3/MM3 (ref 0.7–3.1)
LYMPHOCYTES NFR BLD AUTO: 24.2 % (ref 19.6–45.3)
MCH RBC QN AUTO: 28 PG (ref 26.6–33)
MCHC RBC AUTO-ENTMCNC: 33.2 G/DL (ref 31.5–35.7)
MCV RBC AUTO: 84.5 FL (ref 79–97)
MONOCYTES # BLD AUTO: 0.55 10*3/MM3 (ref 0.1–0.9)
MONOCYTES NFR BLD AUTO: 7.5 % (ref 5–12)
NEUTROPHILS NFR BLD AUTO: 4.74 10*3/MM3 (ref 1.7–7)
NEUTROPHILS NFR BLD AUTO: 64.9 % (ref 42.7–76)
NRBC BLD AUTO-RTO: 0 /100 WBC (ref 0–0.2)
PLATELET # BLD AUTO: 279 10*3/MM3 (ref 140–450)
PMV BLD AUTO: 10.3 FL (ref 6–12)
POTASSIUM SERPL-SCNC: 4.6 MMOL/L (ref 3.5–5.2)
RBC # BLD AUTO: 4.57 10*6/MM3 (ref 3.77–5.28)
SODIUM SERPL-SCNC: 138 MMOL/L (ref 136–145)
TRIGL SERPL-MCNC: 192 MG/DL (ref 0–150)
VLDLC SERPL-MCNC: 32 MG/DL (ref 5–40)
WBC NRBC COR # BLD AUTO: 7.31 10*3/MM3 (ref 3.4–10.8)

## 2024-05-16 PROCEDURE — 85025 COMPLETE CBC W/AUTO DIFF WBC: CPT | Performed by: INTERNAL MEDICINE

## 2024-05-16 PROCEDURE — 80048 BASIC METABOLIC PNL TOTAL CA: CPT | Performed by: INTERNAL MEDICINE

## 2024-05-16 PROCEDURE — 36415 COLL VENOUS BLD VENIPUNCTURE: CPT

## 2024-05-16 PROCEDURE — 71046 X-RAY EXAM CHEST 2 VIEWS: CPT

## 2024-05-16 PROCEDURE — 83036 HEMOGLOBIN GLYCOSYLATED A1C: CPT | Performed by: INTERNAL MEDICINE

## 2024-05-16 NOTE — PROGRESS NOTES
The ABCs of the Annual Wellness Visit  Subsequent Medicare Wellness Visit    Subjective    Shiela Chaudhary is a 89 y.o. female who presents for a Subsequent Medicare Wellness Visit.    The following portions of the patient's history were reviewed and   updated as appropriate: allergies, current medications, past family history, past medical history, past social history, past surgical history, and problem list.    Compared to one year ago, the patient feels her physical   health is the same.    Compared to one year ago, the patient feels her mental   health is the same.    Recent Hospitalizations:  She was not admitted to the hospital during the last year.       Current Medical Providers:  Patient Care Team:  Micheal Almaguer MD as PCP - General (Internal Medicine)    Outpatient Medications Prior to Visit   Medication Sig Dispense Refill    acetaminophen (TYLENOL) 500 MG tablet Take 1 tablet by mouth Every 6 (Six) Hours As Needed for Mild Pain.      amLODIPine (NORVASC) 10 MG tablet TAKE ONE TABLET BY MOUTH DAILY 90 tablet 2    cholecalciferol (VITAMIN D3) 25 MCG (1000 UT) tablet Take 4 tablets by mouth Daily.      citalopram (CeleXA) 20 MG tablet TAKE ONE TABLET BY MOUTH DAILY 90 tablet 3    furosemide (LASIX) 20 MG tablet Take 1 tablet by mouth Daily.      isosorbide mononitrate (IMDUR) 30 MG 24 hr tablet Take 1 tablet by mouth Daily.      losartan (COZAAR) 100 MG tablet TAKE ONE TABLET BY MOUTH DAILY 90 tablet 3    melatonin 5 MG tablet tablet Take  by mouth.      Multiple Vitamins-Minerals (CENTRUM SILVER PO) Take 1 tablet by mouth Daily.      omeprazole (priLOSEC) 40 MG capsule TAKE 1 CAPSULE BY MOUTH DAILY 90 capsule 2    psyllium (METAMUCIL) 58.6 % packet Take 1 packet by mouth.      simvastatin (ZOCOR) 20 MG tablet TAKE ONE TABLET BY MOUTH ONCE NIGHTLY 90 tablet 2    aspirin 81 MG EC tablet Take 1 tablet by mouth.       No facility-administered medications prior to visit.       No opioid medication  "identified on active medication list. I have reviewed chart for other potential  high risk medication/s and harmful drug interactions in the elderly.        Aspirin is on active medication list. Aspirin use is not indicated based on review of current medical condition/s. Risk of harm outweighs potential benefits. Patient instructed to discontinue this medication.  .      Patient Active Problem List   Diagnosis    Essential hypertension    Other hyperlipidemia    Depression    Aortic insufficiency    Osteopenia    Pre-diabetes     Advance Care Planning   Advance Care Planning     Advance Directive is not on file.  ACP discussion was held with the patient during this visit. Patient has an advance directive (not in EMR), copy requested.     Objective    Vitals:    24 1057   BP: 116/80   Weight: 78.4 kg (172 lb 12.8 oz)   Height: 157.5 cm (62.01\")     Estimated body mass index is 31.6 kg/m² as calculated from the following:    Height as of this encounter: 157.5 cm (62.01\").    Weight as of this encounter: 78.4 kg (172 lb 12.8 oz).    BMI is >= 30 and <35. (Class 1 Obesity). The following options were offered after discussion;: exercise counseling/recommendations      Does the patient have evidence of cognitive impairment? No          HEALTH RISK ASSESSMENT    Smoking Status:  Social History     Tobacco Use   Smoking Status Never   Smokeless Tobacco Never     Alcohol Consumption:  Social History     Substance and Sexual Activity   Alcohol Use Yes    Comment: rare     Fall Risk Screen:    SRUTHIADI Fall Risk Assessment was completed, and patient is at LOW risk for falls.Assessment completed on:2024    Depression Screenin/16/2024    11:01 AM   PHQ-2/PHQ-9 Depression Screening   Little Interest or Pleasure in Doing Things 0-->not at all   Feeling Down, Depressed or Hopeless 0-->not at all   PHQ-9: Brief Depression Severity Measure Score 0       Health Habits and Functional and Cognitive Screening:      " 5/16/2024    11:01 AM   Functional & Cognitive Status   Do you have difficulty preparing food and eating? No   Do you have difficulty bathing yourself, getting dressed or grooming yourself? No   Do you have difficulty using the toilet? No   Do you have difficulty moving around from place to place? No   Do you have trouble with steps or getting out of a bed or a chair? No   Current Diet Well Balanced Diet   Dental Exam Up to date   Eye Exam Up to date   Exercise (times per week) 6 times per week        Exercise Frequency Comment walking when weather permits   Current Exercises Include Walking   Do you need help using the phone?  No   Are you deaf or do you have serious difficulty hearing?  No   Do you need help to go to places out of walking distance? No   Do you need help shopping? No   Do you need help preparing meals?  No   Do you need help with housework?  No   Do you need help with laundry? No   Do you need help taking your medications? No   Do you need help managing money? No   Do you ever drive or ride in a car without wearing a seat belt? No   Have you felt unusual stress, anger or loneliness in the last month? No   Who do you live with? Child   If you need help, do you have trouble finding someone available to you? No   Have you been bothered in the last four weeks by sexual problems? No   Do you have difficulty concentrating, remembering or making decisions? No       Age-appropriate Screening Schedule:  Refer to the list below for future screening recommendations based on patient's age, sex and/or medical conditions. Orders for these recommended tests are listed in the plan section. The patient has been provided with a written plan.    Health Maintenance   Topic Date Due    ZOSTER VACCINE (2 of 3) 02/26/2008    COVID-19 Vaccine (6 - 2023-24 season) 01/28/2024    BMI FOLLOWUP  05/01/2024    INFLUENZA VACCINE  08/01/2024    LIPID PANEL  10/26/2024    ANNUAL WELLNESS VISIT  05/16/2025    TDAP/TD VACCINES (2 -  "Td or Tdap) 04/01/2026    RSV Vaccine - Adults  Completed    Pneumococcal Vaccine 65+  Completed    DXA SCAN  Discontinued                  CMS Preventative Services Quick Reference  Risk Factors Identified During Encounter  Immunizations Discussed/Encouraged: Niki  The above risks/problems have been discussed with the patient.  Pertinent information has been shared with the patient in the After Visit Summary.  An After Visit Summary and PPPS were made available to the patient.    Follow Up:   Next Medicare Wellness visit to be scheduled in 1 year.       Additional E&M Note during same encounter follows:  Patient has multiple medical problems which are significant and separately identifiable that require additional work above and beyond the Medicare Wellness Visit.      Chief Complaint  Medicare Wellness-subsequent    Subjective        HPI  Shiela Chaudhary is also being seen today for AWV and HTN    She gets more short of breath if she does too much in a day or if she bends over. She denies cough, wheezing or chest pain. She has chronic swelling in her left leg which she wears compression hose for. Denies depression.         Objective   Vital Signs:  /80   Ht 157.5 cm (62.01\")   Wt 78.4 kg (172 lb 12.8 oz)   BMI 31.60 kg/m²     Physical Exam  Vitals reviewed.   Constitutional:       Appearance: She is well-developed.   HENT:      Head: Normocephalic and atraumatic.   Cardiovascular:      Rate and Rhythm: Normal rate and regular rhythm.      Heart sounds: Normal heart sounds, S1 normal and S2 normal.   Pulmonary:      Effort: Pulmonary effort is normal.      Breath sounds: Normal breath sounds.   Abdominal:      Palpations: Abdomen is soft. There is no hepatomegaly or splenomegaly.   Skin:     General: Skin is warm.   Neurological:      Mental Status: She is alert.   Psychiatric:         Behavior: Behavior normal.                         Assessment and Plan   Diagnoses and all orders for this visit:    1. " Encounter for subsequent annual wellness visit (AWV) in Medicare patient (Primary)    2. Essential hypertension  -     Basic Metabolic Panel  -     Comprehensive Metabolic Panel; Future    3. Other hyperlipidemia  -     Lipid Panel With / Chol / HDL Ratio; Future    4. Nonrheumatic aortic valve insufficiency  Comments:  f/u Dr. Montgomery    5. Pre-diabetes  -     Hemoglobin A1c  -     Hemoglobin A1c; Future    6. Shortness of breath  Comments:  She will call Dr. Montgomery as lasix.  Orders:  -     CBC & Differential  -     XR Chest PA & Lateral             Follow Up   Return in about 6 months (around 11/16/2024) for Lab Today, Lab Before FUP.  Patient was given instructions and counseling regarding her condition or for health maintenance advice. Please see specific information pulled into the AVS if appropriate.       I am okay with her stopping asa. Recc shingrix. I will work up her dyspnea which sounds like maybe a little worse. BP is good and doing well emotionally.

## 2024-05-23 DIAGNOSIS — I10 ESSENTIAL HYPERTENSION: ICD-10-CM

## 2024-05-23 RX ORDER — LOSARTAN POTASSIUM 100 MG/1
100 TABLET ORAL DAILY
Qty: 90 TABLET | Refills: 3 | Status: SHIPPED | OUTPATIENT
Start: 2024-05-23

## 2024-07-26 DIAGNOSIS — I10 ESSENTIAL HYPERTENSION: ICD-10-CM

## 2024-07-26 RX ORDER — AMLODIPINE BESYLATE 10 MG/1
10 TABLET ORAL DAILY
Qty: 90 TABLET | Refills: 2 | Status: SHIPPED | OUTPATIENT
Start: 2024-07-26

## 2024-11-26 ENCOUNTER — OFFICE VISIT (OUTPATIENT)
Dept: INTERNAL MEDICINE | Facility: CLINIC | Age: 89
End: 2024-11-26
Payer: MEDICARE

## 2024-11-26 VITALS
SYSTOLIC BLOOD PRESSURE: 116 MMHG | HEIGHT: 62 IN | WEIGHT: 171.4 LBS | BODY MASS INDEX: 31.54 KG/M2 | DIASTOLIC BLOOD PRESSURE: 78 MMHG

## 2024-11-26 DIAGNOSIS — I10 ESSENTIAL HYPERTENSION: Primary | Chronic | ICD-10-CM

## 2024-11-26 DIAGNOSIS — E78.00 HIGH CHOLESTEROL: Chronic | ICD-10-CM

## 2024-11-26 DIAGNOSIS — R73.03 PRE-DIABETES: ICD-10-CM

## 2024-11-26 PROCEDURE — G2211 COMPLEX E/M VISIT ADD ON: HCPCS | Performed by: INTERNAL MEDICINE

## 2024-11-26 PROCEDURE — 1159F MED LIST DOCD IN RCRD: CPT | Performed by: INTERNAL MEDICINE

## 2024-11-26 PROCEDURE — 1160F RVW MEDS BY RX/DR IN RCRD: CPT | Performed by: INTERNAL MEDICINE

## 2024-11-26 PROCEDURE — 99214 OFFICE O/P EST MOD 30 MIN: CPT | Performed by: INTERNAL MEDICINE

## 2024-11-26 RX ORDER — ISOSORBIDE MONONITRATE 60 MG/1
60 TABLET, EXTENDED RELEASE ORAL DAILY
COMMUNITY

## 2024-11-26 NOTE — PROGRESS NOTES
Subjective        Chief Complaint   Patient presents with    Hypertension           Shiela Chaudhary is a 89 y.o. female who presents for    Patient Active Problem List   Diagnosis    Essential hypertension    High cholesterol    Depression    Aortic insufficiency    Osteopenia    Pre-diabetes       History of Present Illness       She was having more dyspnea and she saw Dr. Montgomery who increased her isosorbide. She denies wheeze or cough.  Allergies   Allergen Reactions    Fosamax [Alendronate] Other (See Comments)     Stomach ache    Lisinopril Cough    Nsaids Nausea And Vomiting       Current Outpatient Medications on File Prior to Visit   Medication Sig Dispense Refill    acetaminophen (TYLENOL) 500 MG tablet Take 1 tablet by mouth Every 6 (Six) Hours As Needed for Mild Pain.      amLODIPine (NORVASC) 10 MG tablet TAKE 1 TABLET BY MOUTH DAILY 90 tablet 2    cholecalciferol (VITAMIN D3) 25 MCG (1000 UT) tablet Take 4 tablets by mouth Daily.      citalopram (CeleXA) 20 MG tablet TAKE ONE TABLET BY MOUTH DAILY 90 tablet 3    furosemide (LASIX) 20 MG tablet Take 1 tablet by mouth Daily.      isosorbide mononitrate (IMDUR) 60 MG 24 hr tablet Take 1 tablet by mouth Daily.      losartan (COZAAR) 100 MG tablet TAKE 1 TABLET BY MOUTH DAILY 90 tablet 3    melatonin 5 MG tablet tablet Take  by mouth.      Multiple Vitamins-Minerals (CENTRUM SILVER PO) Take 1 tablet by mouth Daily.      omeprazole (priLOSEC) 40 MG capsule TAKE 1 CAPSULE BY MOUTH DAILY 90 capsule 2    psyllium (METAMUCIL) 58.6 % packet Take 1 packet by mouth.      simvastatin (ZOCOR) 20 MG tablet TAKE ONE TABLET BY MOUTH ONCE NIGHTLY 90 tablet 2    [DISCONTINUED] isosorbide mononitrate (IMDUR) 30 MG 24 hr tablet Take 1 tablet by mouth Daily.       No current facility-administered medications on file prior to visit.       Past Medical History:   Diagnosis Date    Aortic insufficiency 2015    moderate    BCC (basal cell carcinoma of skin)     Closed displaced  fracture of right femoral neck 08/18/2021    Formatting of this note might be different from the original. Added automatically from request for surgery [id number]    Diverticulosis 2012    History of positive PPD, untreated     Hyperactivity of bladder     LVH (left ventricular hypertrophy)     Pulmonary HTN     Right hip pain     Scoliosis     SOB (shortness of breath)     Tricuspid regurgitation        Past Surgical History:   Procedure Laterality Date    BREAST SURGERY      CATARACT EXTRACTION      COLONOSCOPY  2013    DILATATION AND CURETTAGE      PARTIAL HIP ARTHROPLASTY Right 08/2021       Family History   Problem Relation Age of Onset    Cancer Mother         vaginal    Stroke Father     Cancer Sister         liver    Diabetes Brother     Stroke Brother     Hypertension Brother        Social History     Socioeconomic History    Marital status:    Tobacco Use    Smoking status: Never    Smokeless tobacco: Never   Vaping Use    Vaping status: Never Used   Substance and Sexual Activity    Alcohol use: Yes     Comment: rare    Drug use: No    Sexual activity: Defer           The following portions of the patient's history were reviewed and updated as appropriate: problem list, allergies, current medications, past medical history, past family history, past social history, and past surgical history.    Review of Systems    Immunization History   Administered Date(s) Administered    ABRYSVO (RSV, 60+ or pregnant women 32-36 wks) 09/28/2023    COVID-19 (PFIZER) 12YRS+ (COMIRNATY) 09/28/2023, 10/02/2024    COVID-19 (PFIZER) BIVALENT 12+YRS 10/28/2022    COVID-19 (PFIZER) Purple Cap Monovalent 02/24/2021, 03/17/2021, 09/23/2021    FLUAD TRI 65YR+ 09/24/2019    Fluad Quad 65+ 09/17/2020    Fluzone High-Dose 65+YRS 09/29/2017, 09/30/2018, 09/18/2024    Fluzone High-Dose 65+yrs 10/29/2021, 09/16/2022, 09/23/2023    Hepatitis A 05/01/2018, 01/06/2019    Pneumococcal Conjugate 13-Valent (PCV13) 03/31/2015     "Pneumococcal Polysaccharide (PPSV23) 01/01/2001    Tdap 04/01/2016    Zostavax 01/01/2008       Objective   Vitals:    11/26/24 1056   BP: 116/78   Weight: 77.7 kg (171 lb 6.4 oz)   Height: 157.5 cm (62.01\")     Body mass index is 31.34 kg/m².  Physical Exam  Vitals reviewed.   Constitutional:       Appearance: She is well-developed.   HENT:      Head: Normocephalic and atraumatic.   Cardiovascular:      Rate and Rhythm: Normal rate and regular rhythm.      Heart sounds: Normal heart sounds, S1 normal and S2 normal.   Pulmonary:      Effort: Pulmonary effort is normal.      Breath sounds: Normal breath sounds.   Skin:     General: Skin is warm.   Neurological:      Mental Status: She is alert.   Psychiatric:         Behavior: Behavior normal.         Procedures    Assessment & Plan   Diagnoses and all orders for this visit:    1. Essential hypertension (Primary)  -     Comprehensive Metabolic Panel; Future    2. Pre-diabetes  -     Hemoglobin A1c; Future    3. High cholesterol  -     Lipid Panel With / Chol / HDL Ratio; Future        Draw cmp and A1c today. BP is good.           Return in about 6 months (around 5/26/2025) for Lab Today, Medicare Wellness, Lab Before FUP.  "

## 2024-12-25 DIAGNOSIS — F32.A DEPRESSION, UNSPECIFIED DEPRESSION TYPE: ICD-10-CM

## 2024-12-26 RX ORDER — CITALOPRAM HYDROBROMIDE 20 MG/1
20 TABLET ORAL DAILY
Qty: 90 TABLET | Refills: 3 | Status: SHIPPED | OUTPATIENT
Start: 2024-12-26

## 2024-12-31 DIAGNOSIS — E78.5 HYPERLIPIDEMIA, UNSPECIFIED HYPERLIPIDEMIA TYPE: ICD-10-CM

## 2024-12-31 RX ORDER — SIMVASTATIN 20 MG
TABLET ORAL
Qty: 90 TABLET | Refills: 2 | Status: SHIPPED | OUTPATIENT
Start: 2024-12-31

## 2025-01-27 DIAGNOSIS — K21.9 GASTROESOPHAGEAL REFLUX DISEASE: ICD-10-CM

## 2025-01-27 RX ORDER — OMEPRAZOLE 40 MG/1
40 CAPSULE, DELAYED RELEASE ORAL DAILY
Qty: 90 CAPSULE | Refills: 2 | Status: SHIPPED | OUTPATIENT
Start: 2025-01-27

## 2025-02-28 ENCOUNTER — OFFICE VISIT (OUTPATIENT)
Dept: INTERNAL MEDICINE | Facility: CLINIC | Age: OVER 89
End: 2025-02-28
Payer: MEDICARE

## 2025-02-28 VITALS — WEIGHT: 174 LBS | TEMPERATURE: 97.8 F | BODY MASS INDEX: 32.02 KG/M2 | HEIGHT: 62 IN

## 2025-02-28 DIAGNOSIS — M25.551 PAIN OF RIGHT HIP: Primary | ICD-10-CM

## 2025-02-28 NOTE — PROGRESS NOTES
Subjective        Chief Complaint   Patient presents with    Back Pain     RLQ started when the weather got cold           Shiela Chaudhary is a 89 y.o. female who presents for    Patient Active Problem List   Diagnosis    Essential hypertension    High cholesterol    Depression    Aortic insufficiency    Osteopenia    Pre-diabetes       History of Present Illness     She had right buttocks pain for one day. She used a heating pad. It does not wake her up at night. The pain does not radiate. Denies leg weakness.    Allergies   Allergen Reactions    Fosamax [Alendronate] Other (See Comments)     Stomach ache    Lisinopril Cough    Nsaids Nausea And Vomiting       Current Outpatient Medications on File Prior to Visit   Medication Sig Dispense Refill    acetaminophen (TYLENOL) 500 MG tablet Take 1 tablet by mouth Every 6 (Six) Hours As Needed for Mild Pain.      amLODIPine (NORVASC) 10 MG tablet TAKE 1 TABLET BY MOUTH DAILY 90 tablet 2    cholecalciferol (VITAMIN D3) 25 MCG (1000 UT) tablet Take 4 tablets by mouth Daily.      citalopram (CeleXA) 20 MG tablet TAKE 1 TABLET BY MOUTH DAILY 90 tablet 3    furosemide (LASIX) 20 MG tablet Take 1 tablet by mouth Daily.      isosorbide mononitrate (IMDUR) 60 MG 24 hr tablet Take 1 tablet by mouth Daily.      losartan (COZAAR) 100 MG tablet TAKE 1 TABLET BY MOUTH DAILY 90 tablet 3    melatonin 5 MG tablet tablet Take  by mouth.      Multiple Vitamins-Minerals (CENTRUM SILVER PO) Take 1 tablet by mouth Daily.      omeprazole (priLOSEC) 40 MG capsule TAKE 1 CAPSULE BY MOUTH DAILY 90 capsule 2    psyllium (METAMUCIL) 58.6 % packet Take 1 packet by mouth.      simvastatin (ZOCOR) 20 MG tablet TAKE ONE TABLET BY MOUTH ONCE NIGHTLY 90 tablet 2     No current facility-administered medications on file prior to visit.       Past Medical History:   Diagnosis Date    Aortic insufficiency 2015    moderate    BCC (basal cell carcinoma of skin)     Closed displaced fracture of right femoral  neck 08/18/2021    Formatting of this note might be different from the original. Added automatically from request for surgery [id number]    Diverticulosis 2012    History of positive PPD, untreated     Hyperactivity of bladder     LVH (left ventricular hypertrophy)     Pulmonary HTN     Right hip pain     Scoliosis     SOB (shortness of breath)     Tricuspid regurgitation        Past Surgical History:   Procedure Laterality Date    BREAST SURGERY      CATARACT EXTRACTION      COLONOSCOPY  2013    DILATATION AND CURETTAGE      PARTIAL HIP ARTHROPLASTY Right 08/2021       Family History   Problem Relation Age of Onset    Cancer Mother         vaginal    Stroke Father     Cancer Sister         liver    Diabetes Brother     Stroke Brother     Hypertension Brother        Social History     Socioeconomic History    Marital status:    Tobacco Use    Smoking status: Never    Smokeless tobacco: Never   Vaping Use    Vaping status: Never Used   Substance and Sexual Activity    Alcohol use: Yes     Comment: rare    Drug use: No    Sexual activity: Defer           The following portions of the patient's history were reviewed and updated as appropriate: problem list, allergies, current medications, past medical history, past family history, past social history, and past surgical history.    Review of Systems    Immunization History   Administered Date(s) Administered    ABRYSVO (RSV, 60+ or pregnant women 32-36 wks) 09/28/2023    COVID-19 (PFIZER) 12YRS+ (COMIRNATY) 09/28/2023, 10/02/2024    COVID-19 (PFIZER) BIVALENT 12+YRS 10/28/2022    COVID-19 (PFIZER) Purple Cap Monovalent 02/24/2021, 03/17/2021, 09/23/2021    FLUAD TRI 65YR+ 09/24/2019    Fluad Quad 65+ 09/17/2020    Fluzone High-Dose 65+YRS 09/29/2017, 09/30/2018, 09/18/2024    Fluzone High-Dose 65+yrs 10/29/2021, 09/16/2022, 09/23/2023    Hepatitis A 05/01/2018, 01/06/2019    Pneumococcal Conjugate 13-Valent (PCV13) 03/31/2015    Pneumococcal Polysaccharide (PPSV23)  "01/01/2001    Tdap 04/01/2016    Zostavax 01/01/2008       Objective   Vitals:    02/28/25 1546   Temp: 97.8 °F (36.6 °C)   Weight: 78.9 kg (174 lb)   Height: 157.5 cm (62.01\")     Body mass index is 31.82 kg/m².  Physical Exam  Musculoskeletal:      Comments: Right hip non tender and good ROM    Negative SLR    5/5 strength in legs   Neurological:      Mental Status: She is alert and oriented to person, place, and time.   Psychiatric:         Mood and Affect: Mood normal.         Procedures    Assessment & Plan   Diagnoses and all orders for this visit:    1. Pain of right hip (Primary)        I think it is her lower back. She will use APAP and heat. Can consider PT if worsens.       She sees me in June.    No follow-ups on file.  "

## 2025-03-10 ENCOUNTER — TELEPHONE (OUTPATIENT)
Dept: INTERNAL MEDICINE | Facility: CLINIC | Age: OVER 89
End: 2025-03-10
Payer: MEDICARE

## 2025-03-10 DIAGNOSIS — M25.551 PAIN OF RIGHT HIP: Primary | ICD-10-CM

## 2025-03-10 RX ORDER — CYCLOBENZAPRINE HCL 5 MG
5 TABLET ORAL 2 TIMES DAILY PRN
Qty: 15 TABLET | Refills: 0 | Status: SHIPPED | OUTPATIENT
Start: 2025-03-10

## 2025-03-10 NOTE — TELEPHONE ENCOUNTER
Caller: Shiela Chaudhary    Relationship: Self    Best call back number: [phone]     What medication are you requesting: SOMETHING FOR THE PAIN    What are your current symptoms: LOW BACK PAIN    How long have you been experiencing symptoms: COUPLE WEEKS    Have you had these symptoms before:    [x] Yes  [] No    Have you been treated for these symptoms before:   [] Yes  [] No    If a prescription is needed, what is your preferred pharmacy and phone number: [facility] [id number] - [address] AT [facility]. - [phone] [facility] [phone] FX     Additional notes:

## 2025-03-24 DIAGNOSIS — M25.551 PAIN OF RIGHT HIP: ICD-10-CM

## 2025-03-24 RX ORDER — CYCLOBENZAPRINE HCL 5 MG
5 TABLET ORAL 2 TIMES DAILY PRN
Qty: 15 TABLET | Refills: 0 | Status: SHIPPED | OUTPATIENT
Start: 2025-03-24

## 2025-03-24 NOTE — TELEPHONE ENCOUNTER
Caller: Shiela Chaudhary    Relationship: Self    Best call back number: [phone]     Requested Prescriptions:   Requested Prescriptions     Pending Prescriptions Disp Refills    cyclobenzaprine (FLEXERIL) 5 MG tablet 15 tablet 0     Sig: Take 1 tablet by mouth 2 (Two) Times a Day As Needed for Muscle Spasms.        Pharmacy where request should be sent: [facility] [id number] [address] - [phone] [facility] [phone] FX     Last office visit with prescribing clinician: 2/28/2025   Last telemedicine visit with prescribing clinician: Visit date not found   Next office visit with prescribing clinician: 6/9/2025     Additional details provided by patient:     Does the patient have less than a 3 day supply:  [x] Yes  [] No    Would you like a call back once the refill request has been completed: [] Yes [] No    If the office needs to give you a call back, can they leave a voicemail: [] Yes [] No    April Kianna Machado Rep   03/24/25 13:03 EDT

## 2025-04-28 DIAGNOSIS — I10 ESSENTIAL HYPERTENSION: ICD-10-CM

## 2025-04-28 RX ORDER — AMLODIPINE BESYLATE 10 MG/1
10 TABLET ORAL DAILY
Qty: 90 TABLET | Refills: 2 | Status: SHIPPED | OUTPATIENT
Start: 2025-04-28

## 2025-05-19 DIAGNOSIS — I10 ESSENTIAL HYPERTENSION: ICD-10-CM

## 2025-05-19 RX ORDER — LOSARTAN POTASSIUM 100 MG/1
100 TABLET ORAL DAILY
Qty: 90 TABLET | Refills: 3 | Status: SHIPPED | OUTPATIENT
Start: 2025-05-19

## 2025-06-13 ENCOUNTER — TELEPHONE (OUTPATIENT)
Dept: INTERNAL MEDICINE | Facility: CLINIC | Age: OVER 89
End: 2025-06-13

## 2025-06-13 NOTE — TELEPHONE ENCOUNTER
Hub staff attempted to follow warm transfer process and was unsuccessful     Caller: Shiela Chaudhary    Relationship to patient: Self    Best call back number: [phone]    Patient is needing: PLEASE CALL PATIENT TO SCHEDULE LABS.

## 2025-06-26 ENCOUNTER — LAB (OUTPATIENT)
Facility: HOSPITAL | Age: OVER 89
End: 2025-06-26
Payer: MEDICARE

## 2025-06-26 ENCOUNTER — OFFICE VISIT (OUTPATIENT)
Dept: INTERNAL MEDICINE | Facility: CLINIC | Age: OVER 89
End: 2025-06-26
Payer: MEDICARE

## 2025-06-26 VITALS
BODY MASS INDEX: 31.65 KG/M2 | SYSTOLIC BLOOD PRESSURE: 102 MMHG | HEIGHT: 62 IN | DIASTOLIC BLOOD PRESSURE: 70 MMHG | WEIGHT: 172 LBS

## 2025-06-26 DIAGNOSIS — E78.00 HIGH CHOLESTEROL: Chronic | ICD-10-CM

## 2025-06-26 DIAGNOSIS — Z00.00 ENCOUNTER FOR SUBSEQUENT ANNUAL WELLNESS VISIT (AWV) IN MEDICARE PATIENT: Primary | ICD-10-CM

## 2025-06-26 DIAGNOSIS — I10 ESSENTIAL HYPERTENSION: Chronic | ICD-10-CM

## 2025-06-26 DIAGNOSIS — R73.03 PRE-DIABETES: ICD-10-CM

## 2025-06-26 PROCEDURE — 80053 COMPREHEN METABOLIC PANEL: CPT | Performed by: INTERNAL MEDICINE

## 2025-06-26 PROCEDURE — 83036 HEMOGLOBIN GLYCOSYLATED A1C: CPT | Performed by: INTERNAL MEDICINE

## 2025-06-26 PROCEDURE — 80061 LIPID PANEL: CPT | Performed by: INTERNAL MEDICINE

## 2025-06-26 RX ORDER — AMLODIPINE BESYLATE 5 MG/1
5 TABLET ORAL DAILY
Qty: 90 TABLET | Refills: 2 | Status: SHIPPED | OUTPATIENT
Start: 2025-06-26

## 2025-06-26 NOTE — PROGRESS NOTES
Subjective   The ABCs of the Annual Wellness Visit  Medicare Wellness Visit      Shiela Chaudhary is a 89 y.o. patient who presents for a Medicare Wellness Visit.    The following portions of the patient's history were reviewed and   updated as appropriate: allergies, current medications, past family history, past medical history, past social history, past surgical history, and problem list.    Compared to one year ago, the patient's physical   health is the same.  Compared to one year ago, the patient's mental   health is the same.    Recent Hospitalizations:  She was not admitted to the hospital during the last year.     Current Medical Providers:  Patient Care Team:  Micheal Almaguer MD as PCP - General (Internal Medicine)    Outpatient Medications Prior to Visit   Medication Sig Dispense Refill    acetaminophen (TYLENOL) 500 MG tablet Take 1 tablet by mouth Every 6 (Six) Hours As Needed for Mild Pain.      cholecalciferol (VITAMIN D3) 25 MCG (1000 UT) tablet Take 4 tablets by mouth Daily.      citalopram (CeleXA) 20 MG tablet TAKE 1 TABLET BY MOUTH DAILY 90 tablet 3    cyclobenzaprine (FLEXERIL) 5 MG tablet Take 1 tablet by mouth 2 (Two) Times a Day As Needed for Muscle Spasms. 15 tablet 0    furosemide (LASIX) 20 MG tablet Take 1 tablet by mouth Daily.      isosorbide mononitrate (IMDUR) 60 MG 24 hr tablet Take 1 tablet by mouth Daily.      losartan (COZAAR) 100 MG tablet TAKE 1 TABLET BY MOUTH DAILY 90 tablet 3    melatonin 5 MG tablet tablet Take  by mouth.      Multiple Vitamins-Minerals (CENTRUM SILVER PO) Take 1 tablet by mouth Daily.      omeprazole (priLOSEC) 40 MG capsule TAKE 1 CAPSULE BY MOUTH DAILY 90 capsule 2    psyllium (METAMUCIL) 58.6 % packet Take 1 packet by mouth.      simvastatin (ZOCOR) 20 MG tablet TAKE ONE TABLET BY MOUTH ONCE NIGHTLY 90 tablet 2    amLODIPine (NORVASC) 10 MG tablet TAKE 1 TABLET BY MOUTH DAILY 90 tablet 2     No facility-administered medications prior to visit.     No  "opioid medication identified on active medication list. I have reviewed chart for other potential  high risk medication/s and harmful drug interactions in the elderly.      Aspirin is not on active medication list.  Aspirin use is not indicated based on review of current medical condition/s. Risk of harm outweighs potential benefits.  .    Patient Active Problem List   Diagnosis    Essential hypertension    High cholesterol    Depression    Aortic insufficiency    Osteopenia    Pre-diabetes     Advance Care Planning Advance Directive is not on file.  ACP discussion was held with the patient during this visit. Patient has an advance directive (not in EMR), copy requested.            Objective   Vitals:    25 1113   BP: 102/70   Weight: 78 kg (172 lb)   Height: 157.5 cm (62.01\")       Estimated body mass index is 31.45 kg/m² as calculated from the following:    Height as of this encounter: 157.5 cm (62.01\").    Weight as of this encounter: 78 kg (172 lb).    BMI is >= 30 and <35. (Class 1 Obesity). The following options were offered after discussion;: exercise counseling/recommendations           Does the patient have evidence of cognitive impairment? No                                                                                                Health  Risk Assessment    Smoking Status:  Social History     Tobacco Use   Smoking Status Never   Smokeless Tobacco Never     Alcohol Consumption:  Social History     Substance and Sexual Activity   Alcohol Use Yes    Comment: rare       Fall Risk Screen  STEADI Fall Risk Assessment was completed, and patient is at LOW risk for falls.Assessment completed on:2025    Depression Screening   Little interest or pleasure in doing things? Not at all   Feeling down, depressed, or hopeless? Not at all   PHQ-2 Total Score 0      Health Habits and Functional and Cognitive Screenin/26/2025    11:15 AM   Functional & Cognitive Status   Do you have difficulty " preparing food and eating? No   Do you have difficulty bathing yourself, getting dressed or grooming yourself? Yes   Do you have difficulty using the toilet? No   Do you have difficulty moving around from place to place? No   Do you have trouble with steps or getting out of a bed or a chair? No   Current Diet Well Balanced Diet   Dental Exam Up to date   Eye Exam Up to date   Exercise (times per week) 2 times per week   Current Exercises Include Walking;House Cleaning   Do you need help using the phone?  No   Are you deaf or do you have serious difficulty hearing?  Yes   Do you need help to go to places out of walking distance? Yes   Do you need help shopping? No   Do you need help preparing meals?  No   Do you need help with housework?  No   Do you need help with laundry? No   Do you need help taking your medications? No   Do you need help managing money? No   Do you ever drive or ride in a car without wearing a seat belt? No   Have you felt unusual fatigue (could be tiredness), stress, anger or loneliness in the last month? No   Who do you live with? Child   If you need help, do you have trouble finding someone available to you? No   Have you been bothered in the last four weeks by sexual problems? No   Do you have difficulty concentrating, remembering or making decisions? No           Age-appropriate Screening Schedule:  Refer to the list below for future screening recommendations based on patient's age, sex and/or medical conditions. Orders for these recommended tests are listed in the plan section. The patient has been provided with a written plan.    Health Maintenance List  Health Maintenance   Topic Date Due    ZOSTER VACCINE (2 of 3) 02/26/2008    COVID-19 Vaccine (7 - 2024-25 season) 04/02/2025    LIPID PANEL  05/16/2025    INFLUENZA VACCINE  07/01/2025    TDAP/TD VACCINES (2 - Td or Tdap) 04/01/2026    ANNUAL WELLNESS VISIT  06/26/2026    RSV Vaccine - Adults  Completed    Pneumococcal Vaccine 50+   "Completed    DXA SCAN  Discontinued                                                                                                                                                CMS Preventative Services Quick Reference  Risk Factors Identified During Encounter  Immunizations Discussed/Encouraged: Shingrix    The above risks/problems have been discussed with the patient.  Pertinent information has been shared with the patient in the After Visit Summary.  An After Visit Summary and PPPS were made available to the patient.    Follow Up:   Next Medicare Wellness visit to be scheduled in 1 year.         Additional E&M Note during same encounter follows:  Patient has additional, significant, and separately identifiable condition(s)/problem(s) that require work above and beyond the Medicare Wellness Visit     Chief Complaint  Medicare Wellness-subsequent    Subjective   HPI  Shiela is also being seen today for an annual adult preventative physical exam.   She does not check her BP. Denies chest pain or dizziness. She is doing well emotionally. She only notices dyspnea if she bends over. She does think increasing her isosorbide helped her breathing as well as unhooking bra.              Objective   Vital Signs:  /70   Ht 157.5 cm (62.01\")   Wt 78 kg (172 lb)   BMI 31.45 kg/m²   Physical Exam  Vitals reviewed.   Constitutional:       Appearance: She is well-developed.   HENT:      Head: Normocephalic and atraumatic.   Neck:      Vascular: No carotid bruit.   Cardiovascular:      Rate and Rhythm: Normal rate and regular rhythm.      Heart sounds: Normal heart sounds, S1 normal and S2 normal.   Pulmonary:      Effort: Pulmonary effort is normal.      Breath sounds: Normal breath sounds.   Skin:     General: Skin is warm.   Neurological:      Mental Status: She is alert.   Psychiatric:         Behavior: Behavior normal.              Assessment and Plan         Encounter for subsequent annual wellness visit (AWV) in " Medicare patient    Pre-diabetes    Essential hypertension    High cholesterol     Diagnoses and all orders for this visit:    1. Encounter for subsequent annual wellness visit (AWV) in Medicare patient (Primary)    2. Pre-diabetes    3. Essential hypertension  Assessment & Plan:      Orders:  -     amLODIPine (NORVASC) 5 MG tablet; Take 1 tablet by mouth Daily.  Dispense: 90 tablet; Refill: 2    4. High cholesterol  Comments:  check cmp and flp       New Medications Ordered This Visit   Medications    amLODIPine (NORVASC) 5 MG tablet     Sig: Take 1 tablet by mouth Daily.     Dispense:  90 tablet     Refill:  2          Follow Up   Return in about 2 months (around 8/26/2025).  Patient was given instructions and counseling regarding her condition or for health maintenance advice. Please see specific information pulled into the AVS if appropriate.    She will get labs in the next week. Recommend shingrix. I am going to decrease her novasc with her BP being low.

## 2025-06-27 DIAGNOSIS — M25.551 PAIN OF RIGHT HIP: ICD-10-CM

## 2025-06-27 RX ORDER — CYCLOBENZAPRINE HCL 5 MG
5 TABLET ORAL 2 TIMES DAILY PRN
Qty: 15 TABLET | Refills: 0 | Status: SHIPPED | OUTPATIENT
Start: 2025-06-27

## 2025-06-30 NOTE — TELEPHONE ENCOUNTER
Caller: Shiela Chaudhary    Relationship to patient: Self    Best call back number:     Patient is needing: PATIENT WOULD LIKE TO KNOW IF SHE SHOULD GET THE FLU VACCINE AND COVID BOOSTER TOGETHER, OR IF DR. DON RECOMMEND SHE GET THOSE 2 INJECTIONS SEPARATELY.  PLEASE ADVISE.       Pt last seen 03/2024.  Pt needs to make appt.

## 2025-07-01 ENCOUNTER — TELEPHONE (OUTPATIENT)
Dept: INTERNAL MEDICINE | Facility: CLINIC | Age: OVER 89
End: 2025-07-01

## 2025-07-01 NOTE — TELEPHONE ENCOUNTER
Caller: Shiela Chaudhary    Relationship to patient: Self    Best call back number:      Patient is needing: PATIENT WOULD LIKE A CALLBACK TO CONFIRM SHE SHOULD TAKE A PRESCRIPTION SHE GOT FROM THE PHARMACY.   SHE STATES SHE DOES NOT RECALL SPEAKING WITH DR. DON ABOUT BEING PRESCRIBED CYCLOBENZAPRINE.  PLEASE ADVISE.

## 2025-07-23 DIAGNOSIS — E78.5 HYPERLIPIDEMIA, UNSPECIFIED HYPERLIPIDEMIA TYPE: ICD-10-CM

## 2025-07-23 RX ORDER — SIMVASTATIN 20 MG
20 TABLET ORAL NIGHTLY
Qty: 90 TABLET | Refills: 2 | Status: SHIPPED | OUTPATIENT
Start: 2025-07-23

## 2025-07-26 DIAGNOSIS — K21.9 GASTROESOPHAGEAL REFLUX DISEASE: ICD-10-CM

## 2025-07-28 RX ORDER — OMEPRAZOLE 40 MG/1
40 CAPSULE, DELAYED RELEASE ORAL DAILY
Qty: 90 CAPSULE | Refills: 2 | Status: SHIPPED | OUTPATIENT
Start: 2025-07-28

## 2025-08-28 ENCOUNTER — OFFICE VISIT (OUTPATIENT)
Dept: INTERNAL MEDICINE | Facility: CLINIC | Age: OVER 89
End: 2025-08-28
Payer: MEDICARE

## 2025-08-28 VITALS
HEIGHT: 62 IN | BODY MASS INDEX: 31.54 KG/M2 | DIASTOLIC BLOOD PRESSURE: 60 MMHG | WEIGHT: 171.4 LBS | SYSTOLIC BLOOD PRESSURE: 90 MMHG

## 2025-08-28 DIAGNOSIS — I10 ESSENTIAL HYPERTENSION: Primary | Chronic | ICD-10-CM

## 2025-08-28 DIAGNOSIS — R73.03 PRE-DIABETES: ICD-10-CM

## 2025-08-28 RX ORDER — AMLODIPINE BESYLATE 5 MG/1
5 TABLET ORAL DAILY
COMMUNITY
End: 2025-08-28